# Patient Record
Sex: FEMALE | Race: WHITE | NOT HISPANIC OR LATINO | Employment: OTHER | URBAN - NONMETROPOLITAN AREA
[De-identification: names, ages, dates, MRNs, and addresses within clinical notes are randomized per-mention and may not be internally consistent; named-entity substitution may affect disease eponyms.]

---

## 2017-03-16 ENCOUNTER — TRANSFERRED RECORDS (OUTPATIENT)
Dept: HEALTH INFORMATION MANAGEMENT | Facility: OTHER | Age: 67
End: 2017-03-16

## 2018-11-30 ENCOUNTER — TRANSFERRED RECORDS (OUTPATIENT)
Dept: HEALTH INFORMATION MANAGEMENT | Facility: OTHER | Age: 68
End: 2018-11-30

## 2019-06-28 ENCOUNTER — HOSPITAL ENCOUNTER (OUTPATIENT)
Dept: HOSPITAL 11 - JP.SDS | Age: 69
Discharge: HOME | End: 2019-06-28
Attending: SURGERY
Payer: MEDICARE

## 2019-06-28 DIAGNOSIS — Z86.010: ICD-10-CM

## 2019-06-28 DIAGNOSIS — E78.5: ICD-10-CM

## 2019-06-28 DIAGNOSIS — D12.3: ICD-10-CM

## 2019-06-28 DIAGNOSIS — Z87.891: ICD-10-CM

## 2019-06-28 DIAGNOSIS — Z12.11: Primary | ICD-10-CM

## 2019-06-28 PROCEDURE — 45385 COLONOSCOPY W/LESION REMOVAL: CPT

## 2019-06-28 PROCEDURE — 45380 COLONOSCOPY AND BIOPSY: CPT

## 2019-06-28 NOTE — OR
DATE OF PROCEDURE:  06/28/2019

 

PREOPERATIVE DIAGNOSIS:  History of tubular adenomas.

 

POSTOPERATIVE DIAGNOSES:  Medium sized hepatic flexure polyp, small transverse colon polyp,

history of tubular adenomas.

 

PROCEDURE:  Colonoscopy to the cecum with snare cautery polypectomy of hepatic flexure polyp

and biopsy resection of small transverse colon polyp.

 

ANESTHESIA:  IV anesthesia with monitored anesthesia care.

 

INDICATION:  This 69-year-old white female is referred for a colonoscopy because of a

history of tubular adenoma.  She says her last colonoscopic exam was done 6 years ago.  I

counseled her for the procedure including risks, alternatives, and she gave her informed

consent to proceed.

 

DESCRIPTION OF PROCEDURE:  The patient was placed in the left lateral decubitus position.

IV anesthesia was administered by the Anesthesia Service.  Time-out was held.  A rectal exam

was performed, which was unremarkable.  The flexible video Olympus colonoscope was

introduced through her anus, up her rectum, and out her colon all the way to the cecum.

Once the cecum was reached, the scope was slowly withdrawn examining the mucosa throughout.

We saw a small polyp in the transverse colon, which was removed with the biopsy forceps.  We

thought we saw a polyp at the hepatic flexure, so we went back there and found it.  This was

little larger.  This was removed with the snare that involved placing a snare about its

base, elevating it up away from the bowel wall and amputated as electrocautery was applied.

The polyp was aspirated through the scope and captured in a polyp trap.  The scope was then

withdrawn further with no other lesions noted.  The scope was retroflexed in the rectum with

the distal rectum appearing unremarkable.  The scope was straightened and removed.  She

tolerated the procedure well.

 

 

 

 

Kali Damon MD

DD:  06/28/2019 09:25:59

DT:  06/28/2019 10:16:04

Job #:  [id number]

## 2020-09-10 ENCOUNTER — TRANSFERRED RECORDS (OUTPATIENT)
Dept: HEALTH INFORMATION MANAGEMENT | Facility: OTHER | Age: 70
End: 2020-09-10

## 2022-04-13 ENCOUNTER — OFFICE VISIT (OUTPATIENT)
Dept: FAMILY MEDICINE | Facility: OTHER | Age: 72
End: 2022-04-13
Attending: FAMILY MEDICINE
Payer: COMMERCIAL

## 2022-04-13 VITALS
WEIGHT: 230.2 LBS | OXYGEN SATURATION: 96 % | DIASTOLIC BLOOD PRESSURE: 80 MMHG | HEIGHT: 63 IN | RESPIRATION RATE: 16 BRPM | TEMPERATURE: 97.7 F | HEART RATE: 80 BPM | BODY MASS INDEX: 40.79 KG/M2 | SYSTOLIC BLOOD PRESSURE: 138 MMHG

## 2022-04-13 DIAGNOSIS — Z12.11 COLON CANCER SCREENING: ICD-10-CM

## 2022-04-13 DIAGNOSIS — R32 URINARY INCONTINENCE, UNSPECIFIED TYPE: ICD-10-CM

## 2022-04-13 DIAGNOSIS — M70.62 TROCHANTERIC BURSITIS OF LEFT HIP: ICD-10-CM

## 2022-04-13 DIAGNOSIS — G62.9 NEUROPATHY: ICD-10-CM

## 2022-04-13 DIAGNOSIS — E66.01 MORBID OBESITY (H): ICD-10-CM

## 2022-04-13 DIAGNOSIS — Z00.00 MEDICARE ANNUAL WELLNESS VISIT, SUBSEQUENT: Primary | ICD-10-CM

## 2022-04-13 DIAGNOSIS — Z78.0 ASYMPTOMATIC MENOPAUSE: ICD-10-CM

## 2022-04-13 PROCEDURE — G0439 PPPS, SUBSEQ VISIT: HCPCS | Performed by: FAMILY MEDICINE

## 2022-04-13 PROCEDURE — G0463 HOSPITAL OUTPT CLINIC VISIT: HCPCS

## 2022-04-13 PROCEDURE — 99203 OFFICE O/P NEW LOW 30 MIN: CPT | Mod: 25 | Performed by: FAMILY MEDICINE

## 2022-04-13 RX ORDER — PROMETHAZINE HYDROCHLORIDE 25 MG/1
1 TABLET ORAL DAILY
COMMUNITY
Start: 2021-07-08

## 2022-04-13 RX ORDER — GABAPENTIN 300 MG/1
300 CAPSULE ORAL 2 TIMES DAILY
Qty: 180 CAPSULE | Refills: 0 | Status: SHIPPED | OUTPATIENT
Start: 2022-04-13 | End: 2022-06-09

## 2022-04-13 RX ORDER — CHLORAL HYDRATE 500 MG
1 CAPSULE ORAL DAILY
COMMUNITY

## 2022-04-13 RX ORDER — OXYBUTYNIN CHLORIDE 5 MG/1
5 TABLET ORAL
COMMUNITY
Start: 2021-10-28 | End: 2022-04-13 | Stop reason: SINTOL

## 2022-04-13 RX ORDER — IBUPROFEN 800 MG/1
800 TABLET, FILM COATED ORAL EVERY 8 HOURS PRN
Status: CANCELLED | OUTPATIENT
Start: 2022-04-13

## 2022-04-13 RX ORDER — IBUPROFEN 800 MG/1
1 TABLET, FILM COATED ORAL EVERY 8 HOURS PRN
COMMUNITY
Start: 2020-06-04 | End: 2022-10-05

## 2022-04-13 RX ORDER — OMEPRAZOLE 40 MG/1
40 CAPSULE, DELAYED RELEASE ORAL DAILY PRN
COMMUNITY
Start: 2020-08-03 | End: 2022-10-05

## 2022-04-13 RX ORDER — GABAPENTIN 300 MG/1
300 CAPSULE ORAL 2 TIMES DAILY
COMMUNITY
Start: 2020-06-04 | End: 2022-04-13

## 2022-04-13 RX ORDER — MENTHOL 40 MG/ML
GEL TOPICAL PRN
COMMUNITY

## 2022-04-13 ASSESSMENT — ACTIVITIES OF DAILY LIVING (ADL): CURRENT_FUNCTION: HOUSEWORK REQUIRES ASSISTANCE

## 2022-04-13 ASSESSMENT — PAIN SCALES - GENERAL: PAINLEVEL: NO PAIN (0)

## 2022-04-13 NOTE — PROGRESS NOTES
"SUBJECTIVE:   Denise Barclay is a 71 year old female who presents for Preventive Visit.    Patient has been advised of split billing requirements and indicates understanding: Yes  Are you in the first 12 months of your Medicare Part B coverage?  No    Physical Health:    In general, how would you rate your overall physical health? good    Outside of work, how many days during the week do you exercise? 4-5 days/week    Outside of work, approximately how many minutes a day do you exercise?not applicable    If you drink alcohol do you typically have >3 drinks per day or >7 drinks per week? No    Do you usually eat at least 4 servings of fruit and vegetables a day, include whole grains & fiber and avoid regularly eating high fat or \"junk\" foods? Yes    Do you have any problems taking medications regularly?  No    Do you have any side effects from medications? none    Needs assistance for the following daily activities: housework    Which of the following safety concerns are present in your home?  none identified     Hearing impairment: No    In the past 6 months, have you been bothered by leaking of urine? yes    Mental Health:    In general, how would you rate your overall mental or emotional health? good  PHQ-2 Score: 0    Do you feel safe in your environment? Yes    Have you ever done Advance Care Planning? (For example, a Health Directive, POLST, or a discussion with a medical provider or your loved ones about your wishes): No, advance care planning information given to patient to review.  Patient plans to discuss their wishes with loved ones or provider.      Additional concerns to address?  YES urinary  She has had issues for years.  She reports continual leaking of small amounts of urine throughout the day and the need to urinate frequently throughout the night.  She has discussed this with her previous primary care provider and was tried on Oxybutynin but reports that this caused significant dryness of her " "mouth.  She discontinued this medication after about five days, as she could not tolerate it.  She was referred to urology but this was not scheduled prior to leaving for [place] for four months.    Fall risk:  Fallen 2 or more times in the past year?: No  Any fall with injury in the past year?: No    Cognitive Screenin) Repeat 3 items (Leader, Season, Table)    2) Clock draw: NORMAL  3) 3 item recall: Recalls 2 objects   Results: NORMAL clock, 1-2 items recalled: COGNITIVE IMPAIRMENT LESS LIKELY    Mini-CogTM Copyright [employer]LE[employer] Shah. Licensed by the author for use in [facility]; reprinted with permission (clari@[facility]). All rights reserved.      Do you have sleep apnea, excessive snoring or daytime drowsiness?: no    Neuropathy:  She has been diagnosed with neuropathy in her legs bilaterally.  She was managed on Gabapentin for several years and then discontinued them on her own.  When she developed pain in her left foot, she was placed back on this medication.  She reports that she took the medication consistently throughout the carli and winter but does not feel that it particularly helped her.  Current pain is located around her left ankle.  She describes the pain as burning.  Symptoms are intermittent and seem to occur randomly.  She states that she was sent for a test for neuropathy and was told she had symptoms on both of her legs.    She has a history of left trochanteric bursitis.  She states that she has been told she has a \"ruptured bursa\" in the past.  This has been managed with injections every four months with good results.  She uses Ibuprofen as needed for any breakthrough pain.    Reviewed and updated as needed this visit by clinical staff   Tobacco  Allergies  Meds   Med Hx  Surg Hx  Fam Hx  Soc Hx        Reviewed and updated as needed this visit by Provider                   Social History     Tobacco Use     Smoking status: Never Smoker     Smokeless tobacco: Never Used " "  Substance Use Topics     Alcohol use: Not Currently                    Current providers sharing in care for this patient include:  Patient Care Team:  No Ref-Primary, Physician as PCP - General    The following health maintenance items are reviewed in Epic and correct as of today:  Health Maintenance   Topic Date Due     DEXA  Never done     ADVANCE CARE PLANNING  Never done     COLORECTAL CANCER SCREENING  Never done     HEPATITIS C SCREENING  Never done     LIPID  Never done     MEDICARE ANNUAL WELLNESS VISIT  Never done     ZOSTER IMMUNIZATION (2 of 3) 03/07/2016     MAMMO SCREENING  09/10/2022     FALL RISK ASSESSMENT  04/13/2023     DTAP/TDAP/TD IMMUNIZATION (2 - Td or Tdap) 01/11/2026     PHQ-2 (once per calendar year)  Completed     INFLUENZA VACCINE  Completed     Pneumococcal Vaccine: 65+ Years  Completed     COVID-19 Vaccine  Completed     IPV IMMUNIZATION  Aged Out     MENINGITIS IMMUNIZATION  Aged Out     HEPATITIS B IMMUNIZATION  Aged Out     Labs reviewed in EPIC  Mammogram Screening: Mammogram Screening: Recommended mammography every 1-2 years with patient discussion and risk factor consideration    ROS:  Constitutional, HEENT, cardiovascular, pulmonary, GI, , musculoskeletal, neuro, skin, endocrine and psych systems are negative, except as otherwise noted.    OBJECTIVE:   BP (!) 144/80   Pulse 80   Temp 97.7  F (36.5  C) (Tympanic)   Resp 16   Ht 1.6 m (5' 3\")   Wt 104.4 kg (230 lb 3.2 oz)   SpO2 96%   Breastfeeding No   BMI 40.78 kg/m   Estimated body mass index is 40.78 kg/m  as calculated from the following:    Height as of this encounter: 1.6 m (5' 3\").    Weight as of this encounter: 104.4 kg (230 lb 3.2 oz).  EXAM:   GENERAL: healthy, alert and no distress  EYES: Eyes grossly normal to inspection, PERRL and conjunctivae and sclerae normal  HENT: ear canals with cerumen impaction bilaterally, nose and mouth without ulcers or lesions  NECK: no adenopathy, no asymmetry, masses, or " scars and thyroid normal to palpation  RESP: lungs clear to auscultation - no rales, rhonchi or wheezes  CV: regular rate and rhythm, normal S1 S2, no S3 or S4, no murmur, click or rub, no peripheral edema and peripheral pulses strong  ABDOMEN: soft, nontender, no hepatosplenomegaly, no masses and bowel sounds normal  MS: no gross musculoskeletal defects noted, no edema  NEURO: Normal strength and tone, mentation intact and speech normal  PSYCH: mentation appears normal, affect normal/bright    Diagnostic Test Results:  Labs reviewed in Epic    ASSESSMENT / PLAN:   (Z00.00) Medicare annual wellness visit, subsequent  (primary encounter diagnosis)  No indication for daily ASA.  BP borderline.  Goal < 130/80.  Continue to monitor.  Lipid panel reviewed.  She has been working on a healthier diet and lifestyle.  Will plan to recheck lipid levels in six months.  Due for laboratory monitoring after July.  Follow-up at that time.  She declines Hepatitis C screening today but would consider having it completed with her other blood work this summer.  S/P breast reduction surgery with bilateral fat necrosis and breast tenderness.  Discussed risks and benefits of mammogram for breast cancer screening.  She declines this year.  She will be due for colon cancer screening this year.  Referral placed.  No indication for further cervical cancer screening.  DEXA ordered for bone density assessment.  Encouraged her to obtain Shingrix vaccine through her pharmacy.  Remainder of immunizations up-to-date.  Counseled on healthy diet and exercise.  No depression.  No cognitive impairment.    (G62.9) Neuropathy  Comment: She will continue Gabapentin for now and consider whether she wants to continue this medication when her prescription runs out.  Plan: gabapentin (NEURONTIN) 300 MG capsule    (M70.62) Trochanteric bursitis of left hip  Comment: Control of pain with injections and Ibuprofen use as needed, rarely.  Plan: She will continue  "with injections every four months.    (R32) Urinary incontinence, unspecified type  Comment: She has failed medication management and was in the process of being referred to urology with her previous PCP.  Referral placed today.  Plan: Adult Urology Referral    (Z78.0) Asymptomatic menopause  Plan: DX Hip/Pelvis/Spine    (Z12.11) Colon cancer screening  Plan: Adult Gastro Ref - Procedure Only    (E66.01) Morbid obesity (H)  Working on healthy diet and lifestyle to promote weight loss.  Reassess on follow-up in six months.    Patient has been advised of split billing requirements and indicates understanding: Yes    COUNSELING:  Reviewed preventive health counseling, as reflected in patient instructions       Regular exercise       Healthy diet/nutrition       Vision screening       Hearing screening       Dental care       Bladder control       Immunizations       Osteoporosis prevention/bone health       Hepatitis C screening    Estimated body mass index is 40.78 kg/m  as calculated from the following:    Height as of this encounter: 1.6 m (5' 3\").    Weight as of this encounter: 104.4 kg (230 lb 3.2 oz).    Weight management plan: Discussed healthy diet and exercise guidelines    She reports that she has never smoked. She has never used smokeless tobacco.    Appropriate preventive services were discussed with this patient, including applicable screening as appropriate for cardiovascular disease, diabetes, osteopenia/osteoporosis, and glaucoma.  As appropriate for age/gender, discussed screening for colorectal cancer, prostate cancer, breast cancer, and cervical cancer. Checklist reviewing preventive services available has been given to the patient.    Reviewed patients plan of care and provided an AVS. The Basic Care Plan (routine screening as documented in Health Maintenance) for Denise meets the Care Plan requirement. This Care Plan has been established and reviewed with the Patient.    Counseling " Resources:  ATP IV Guidelines  Pooled Cohorts Equation Calculator  Breast Cancer Risk Calculator  BRCA-Related Cancer Risk Assessment: FHS-7 Tool  FRAX Risk Assessment  ICSI Preventive Guidelines  Dietary Guidelines for Americans, 2010  USDA's MyPlate  ASA Prophylaxis  Lung CA Screening    [facility]

## 2022-04-13 NOTE — NURSING NOTE
"Chief Complaint   Patient presents with     Medicare Visit     annual         Initial /80   Pulse 80   Temp 97.7  F (36.5  C) (Tympanic)   Resp 16   Ht 1.6 m (5' 3\")   Wt 104.4 kg (230 lb 3.2 oz)   SpO2 96%   Breastfeeding No   BMI 40.78 kg/m   Estimated body mass index is 40.78 kg/m  as calculated from the following:    Height as of this encounter: 1.6 m (5' 3\").    Weight as of this encounter: 104.4 kg (230 lb 3.2 oz).         Norma J. Gosselin, LPN   "

## 2022-04-25 DIAGNOSIS — Z12.11 ENCOUNTER FOR SCREENING COLONOSCOPY: Primary | ICD-10-CM

## 2022-04-25 NOTE — TELEPHONE ENCOUNTER
Screening Questions for the Scheduling of Screening Colonoscopies   (If Colonoscopy is diagnostic, Provider should review the chart before scheduling.)  Are you younger than 50 or older than 80?  NO  Do you take aspirin or fish oil?  YES (if yes, tell patient to stop 1 week prior to Colonoscopy)  Do you take warfarin (Coumadin), clopidogrel (Plavix), apixaban (Eliquis), dabigatram (Pradaxa), rivaroxaban (Xarelto) or any blood thinner? NO  Do you use oxygen at home?  NO  Do you have kidney disease? NO  Are you on dialysis? NO  Have you had a stroke or heart attack in the last year? NO  Have you had a stent in your heart or any blood vessel in the last year? NO  Have you had a transplant of any organ? NO  Have you had a colonoscopy or upper endoscopy (EGD) before? YES         When?  06/28/2019  Date of scheduled Colonoscopy. 06/16/2022  Provider PRATIK  Pharmacy GUIDE POINT IN [place]

## 2022-04-26 RX ORDER — POLYETHYLENE GLYCOL 3350, SODIUM CHLORIDE, SODIUM BICARBONATE, POTASSIUM CHLORIDE 420; 11.2; 5.72; 1.48 G/4L; G/4L; G/4L; G/4L
4000 POWDER, FOR SOLUTION ORAL ONCE
Qty: 4000 ML | Refills: 0 | Status: SHIPPED | OUTPATIENT
Start: 2022-04-26 | End: 2022-04-26

## 2022-04-26 RX ORDER — BISACODYL 5 MG
TABLET, DELAYED RELEASE (ENTERIC COATED) ORAL
Qty: 2 TABLET | Refills: 0 | Status: ON HOLD | OUTPATIENT
Start: 2022-04-26 | End: 2022-06-16

## 2022-04-28 ENCOUNTER — HOSPITAL ENCOUNTER (OUTPATIENT)
Dept: BONE DENSITY | Facility: OTHER | Age: 72
Discharge: HOME | End: 2022-04-28
Attending: FAMILY MEDICINE | Admitting: FAMILY MEDICINE
Payer: COMMERCIAL

## 2022-04-28 DIAGNOSIS — Z78.0 ASYMPTOMATIC MENOPAUSE: ICD-10-CM

## 2022-04-28 PROCEDURE — 77080 DXA BONE DENSITY AXIAL: CPT

## 2022-05-04 ENCOUNTER — OFFICE VISIT (OUTPATIENT)
Dept: UROLOGY | Facility: OTHER | Age: 72
End: 2022-05-04
Attending: FAMILY MEDICINE
Payer: COMMERCIAL

## 2022-05-04 VITALS
OXYGEN SATURATION: 98 % | BODY MASS INDEX: 39.79 KG/M2 | HEART RATE: 72 BPM | SYSTOLIC BLOOD PRESSURE: 138 MMHG | WEIGHT: 224.6 LBS | DIASTOLIC BLOOD PRESSURE: 82 MMHG | RESPIRATION RATE: 18 BRPM

## 2022-05-04 DIAGNOSIS — N32.81 OVERACTIVE BLADDER: Primary | ICD-10-CM

## 2022-05-04 DIAGNOSIS — N39.41 URGE INCONTINENCE: ICD-10-CM

## 2022-05-04 LAB
ALBUMIN UR-MCNC: NEGATIVE MG/DL
APPEARANCE UR: CLEAR
BACTERIA #/AREA URNS HPF: ABNORMAL /HPF
BILIRUB UR QL STRIP: NEGATIVE
COLOR UR AUTO: YELLOW
GLUCOSE UR STRIP-MCNC: NEGATIVE MG/DL
HGB UR QL STRIP: ABNORMAL
KETONES UR STRIP-MCNC: NEGATIVE MG/DL
LEUKOCYTE ESTERASE UR QL STRIP: ABNORMAL
MUCOUS THREADS #/AREA URNS LPF: PRESENT /LPF
NITRATE UR QL: NEGATIVE
PH UR STRIP: 5.5 [PH] (ref 5–9)
RBC URINE: 2 /HPF
RENAL TUB EPI: <1 /HPF
SP GR UR STRIP: 1.02 (ref 1–1.03)
SQUAMOUS EPITHELIAL: 12 /HPF
TRANSITIONAL EPI: 1 /HPF
UROBILINOGEN UR STRIP-MCNC: NORMAL MG/DL
WBC URINE: 20 /HPF

## 2022-05-04 PROCEDURE — 81001 URINALYSIS AUTO W/SCOPE: CPT | Mod: ZL | Performed by: UROLOGY

## 2022-05-04 PROCEDURE — G0463 HOSPITAL OUTPT CLINIC VISIT: HCPCS | Mod: 25

## 2022-05-04 PROCEDURE — G0463 HOSPITAL OUTPT CLINIC VISIT: HCPCS

## 2022-05-04 PROCEDURE — 51798 US URINE CAPACITY MEASURE: CPT | Performed by: UROLOGY

## 2022-05-04 PROCEDURE — 99204 OFFICE O/P NEW MOD 45 MIN: CPT | Performed by: UROLOGY

## 2022-05-04 ASSESSMENT — PAIN SCALES - GENERAL: PAINLEVEL: NO PAIN (0)

## 2022-05-04 NOTE — PROGRESS NOTES
Type of Visit  NPV    Chief Complaint  Incontinence    HPI  Ms. Barclay is a 71 year old female who presents with incontinence.  Patient leaks urine about several times a day.  Volume of incontinence is described as small .  Overall, leaking urine interferes (bother score) with daily living approximately 9/10.  Patient uses 2 pads per day.  Onset was 3 years ago.  Patient denies: dysuria and gross hematuria.    Patient has tried anticholinergic medication in the past however she developed dose-limiting side effects (dry mouth and eyes) prompting discontinuation.    Leakage of urine occurs with urgency? Yes  Leakage of urine occurs with valsalva? Yes  Leakage of urine occurs without sensation? 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  Patient Active Problem List   Diagnosis     Morbid obesity (H)       Past Surgical History  She  has a past surgical history that includes REDUCTION OF LARGE BREAST (Bilateral, 10/2020).    Medications  She has a current medication list which includes the following prescription(s): bisacodyl, fish oil-omega-3 fatty acids, gabapentin, ibuprofen, biofreeze roll-on, multiple vitamins-iron, and omeprazole.    Allergies  No Known Allergies    Social History  She  reports that she has never smoked. She has never used smokeless tobacco. She reports previous alcohol use. She reports that she does not use drugs.  No drug abuse.    Family History  Family History   Problem Relation Age of Onset     Thyroid Disease Sister      Thyroid Disease Sister        Review of Systems  I personally reviewed the ROS with the patient.    Nursing Notes:   Marysol Benavides LPN  5/4/2022 11:11 AM  Addendum  Chief Complaint   Patient presents with     Consult     incontinence   Patient presents to the clinic today for a consult for incontinence    Review of Systems:    Weight loss:    No     Recent fever/chills:  No   Night  sweats:   No  Current skin rash:  No   Recent hair loss:  No  Heat intolerance:  No   Cold intolerance:  No  Chest pain:   No   Palpitations:   No  Shortness of breath:  No   Wheezing:   No  Constipation:    No   Diarrhea:   No   Nausea:   No   Vomiting:   No   Kidney/side pain:  No   Back pain:   No  Frequent headaches:  No   Dizziness:     No  Leg swelling:   No   Calf pain:    No    Parents, brothers or sisters with history of kidney cancer:   No  Parents, brothers or sisters with history of bladder cancer: No     Post-Void Residual  A post-void residual was measured by ultrasonic bladder scanner.  0 mL        Medication Reconciliation: completed   Marysol Benavides LPN  5/4/2022 11:10 AM       Physical Exam  Vitals:    05/04/22 1130   BP: 138/82   BP Location: Right arm   Patient Position: Sitting   Cuff Size: Adult Large   Pulse: 72   Resp: 18   SpO2: 98%   Weight: 101.9 kg (224 lb 9.6 oz)     Constitutional: NAD, WDWN  Head: NCAT  Eyes: Conjunctivae normal  Cardiovascular: Regular rate  Pulmonary/Chest: Respirations are even and non-labored bilaterally  Abdominal: Soft with no distension, tenderness, masses, guarding or CVA tenderness  Neurological: A + O x 3,  cranial nerves II-XII grossly intact  Extremities: NOLBERTO x 4, warm, no clubbing, no cyanosis  Skin: Pink, warm, dry with no rash  Psychiatric:  Normal mood and affect  Genitourinary: Nonpalpable bladder    Labs  Results for orders placed or performed in visit on 05/04/22   UA reflex to Microscopic     Status: Abnormal   Result Value Ref Range    Color Urine Yellow Colorless, Straw, Light Yellow, Yellow    Appearance Urine Clear Clear    Glucose Urine Negative Negative mg/dL    Bilirubin Urine Negative Negative    Ketones Urine Negative Negative mg/dL    Specific Gravity Urine 1.020 1.000 - 1.030    Blood Urine Trace (A) Negative    pH Urine 5.5 5.0 - 9.0    Protein Albumin Urine Negative Negative mg/dL    Urobilinogen Urine Normal Normal, 2.0 mg/dL    Nitrite  Urine Negative Negative    Leukocyte Esterase Urine Large (A) Negative    Bacteria Urine Few (A) None Seen /HPF    RBC Urine 2 <=2 /HPF    WBC Urine 20 (H) <=5 /HPF    Squamous Epithelials Urine 12 (H) <=1 /HPF    Mucus Urine Present (A) None Seen /LPF    Transitional Epithelials Urine 1 <=1 /HPF    Renal Tubular Epithelials Urine <1 None Seen /HPF     Post-Void Residual  A post-void residual was measured by ultrasonic bladder scanner.  0 mL today    Assessment  Ms. Barclay is a 71 year old female who presents with clinically predominate urge incontinence.  PVR reveals she is emptying well.  Urinalysis is contaminated given the significant number of squamous cells.  She is asymptomatic and, as such, the urinalysis is interpreted as negative for UTI.  No abnormal blood present.    Patient complains of significant urinary frequency, urgency and urge incontinence.    Prior treatments include attempted behavior modification and anticholinergic medication.  Failure of anticholinergics due to poor efficacy and intolerable side effects.  Treatment options were discussed: Botox injection, posterior tibial nerve stimulation, and Interstim neuromodulation.      Patient elected to proceed with intradetrusor Botox injections.  Discussed risks of UTI and/or urinary retention (6%) requiring self cath or indwelling catheter for a temporary period of time.  The benefit lasts about 6 months on average and I explained the need for retreatment.    Plan  Intravesical Botox 100 units in the clinic

## 2022-05-04 NOTE — NURSING NOTE
Chief Complaint   Patient presents with     Consult     incontinence   Patient presents to the clinic today for a consult for incontinence    Review of Systems:    Weight loss:    No     Recent fever/chills:  No   Night sweats:   No  Current skin rash:  No   Recent hair loss:  No  Heat intolerance:  No   Cold intolerance:  No  Chest pain:   No   Palpitations:   No  Shortness of breath:  No   Wheezing:   No  Constipation:    No   Diarrhea:   No   Nausea:   No   Vomiting:   No   Kidney/side pain:  No   Back pain:   No  Frequent headaches:  No   Dizziness:     No  Leg swelling:   No   Calf pain:    No    Parents, brothers or sisters with history of kidney cancer:   No  Parents, brothers or sisters with history of bladder cancer: No     Post-Void Residual  A post-void residual was measured by ultrasonic bladder scanner.  0 mL        Medication Reconciliation: completed   Marysol Benavides LPN  5/4/2022 11:10 AM

## 2022-06-01 ENCOUNTER — HOSPITAL ENCOUNTER (OUTPATIENT)
Dept: MAMMOGRAPHY | Facility: OTHER | Age: 72
Discharge: HOME | End: 2022-06-01
Attending: FAMILY MEDICINE | Admitting: FAMILY MEDICINE
Payer: COMMERCIAL

## 2022-06-01 DIAGNOSIS — Z12.31 VISIT FOR SCREENING MAMMOGRAM: ICD-10-CM

## 2022-06-01 PROCEDURE — 77067 SCR MAMMO BI INCL CAD: CPT

## 2022-06-02 ENCOUNTER — OFFICE VISIT (OUTPATIENT)
Dept: UROLOGY | Facility: OTHER | Age: 72
End: 2022-06-02
Attending: UROLOGY
Payer: COMMERCIAL

## 2022-06-02 DIAGNOSIS — N32.81 OVERACTIVE BLADDER: Primary | ICD-10-CM

## 2022-06-02 PROCEDURE — G0463 HOSPITAL OUTPT CLINIC VISIT: HCPCS | Mod: 25

## 2022-06-02 PROCEDURE — 52287 CYSTOSCOPY CHEMODENERVATION: CPT | Performed by: UROLOGY

## 2022-06-02 PROCEDURE — 96372 THER/PROPH/DIAG INJ SC/IM: CPT | Performed by: UROLOGY

## 2022-06-02 PROCEDURE — 250N000020 HC RX IP 250 OP 636 J0585: Performed by: UROLOGY

## 2022-06-02 PROCEDURE — 250N000013 HC RX MED GY IP 250 OP 250 PS 637: Performed by: UROLOGY

## 2022-06-02 RX ORDER — CEFUROXIME AXETIL 250 MG/1
500 TABLET ORAL EVERY 12 HOURS SCHEDULED
Status: DISCONTINUED | OUTPATIENT
Start: 2022-06-02 | End: 2022-06-09

## 2022-06-02 RX ADMIN — ONABOTULINUMTOXINA 100 UNITS: 100 INJECTION, POWDER, LYOPHILIZED, FOR SOLUTION INTRADERMAL; INTRAMUSCULAR at 13:19

## 2022-06-02 RX ADMIN — CEFUROXIME AXETIL 500 MG: 250 TABLET, FILM COATED ORAL at 13:19

## 2022-06-02 NOTE — PATIENT INSTRUCTIONS
Home Care after Cystoscopy  Follow these guidelines for your care after your procedure.    Activity  No limitations    Bathing or showering  No limitations    Symptoms  You may notice some burning with urination but this usually resolves after 1-2 days.  You may also notice small amounts of blood in your urine.  Please increase water intake for the next few days to help with these symptoms.    Contacts  General Questions: [phone]  Appointments:  [phone]  Emergencies:  [phone]    When to call the clinic  If you develop any of the following symptoms please call the clinic immediately.  If the clinic is closed please be seen at an urgent care clinic or the Emergency Department.  - Burning with urination that worsens after 2 days  - Unable to urinate causing severe pelvic pain  - Fevers of greater than 101 degrees F  - Flank pain that is not responding to pain medication    Follow up  Please follow up as discussed at the appointment.    Home Care after Botox Treatment  Follow these guidelines for your care after your procedure.    Activity  No limitations    Bathing or showering  No limitations    Symptoms  You may notice some burning with urination but this usually resolves after 1-2 days.  You may also notice small amounts of blood in your urine.  Please increase water intake for the next few days to help with these symptoms.    Contacts  General Questions: [phone]  Appointments:  [phone]  Emergencies:  [phone]    When to call the clinic  If you develop any of the following symptoms please call the clinic immediately.  If the clinic is closed please be seen at an urgent care clinic or the Emergency Department.  - Burning with urination that worsens after 2 days  - Unable to urinate causing severe pelvic pain  - Fevers of greater than 101 degrees F  - Flank pain that is not responding to pain medication    Follow up  If you are currently taking an anticholinergic for urgency (such as oxybutynin,  Detrol, Toviaz or Sanctura) please continue for 1 week then stop.  Please follow up in 4-6 weeks for a bladder scan.

## 2022-06-02 NOTE — NURSING NOTE
Botox  Verbal Order per Brendon Lyle MD Read Back to prep patient.  Perineum area prepped with chlorhexidene Gluconate and patient draped per sterile technique.    Sodium Chloride 0.9%, 10mL mixed with Botox  Lot #: [id number]  Expiration date: 11/1/23  : [employer]  NDC: [id number]    [facility] Protocol    A. Pre-procedure verification complete Yes  1-relevant information / documentation available, reviewed and properly matched to the patient; 2-consent accurate and complete, 3-equipment and supplies available    B. Site marking complete N/A  Site marked if not in continuous attendance with patient    C. TIME OUT completed Yes  Time Out was conducted just prior to starting procedure to verify the eight required elements: 1-patient identity, 2-consent accurate and complete, 3-position, 4-correct side/site marked (if applicable), 5-procedure, 6-relevant images / results properly labeled and displayed (if applicable), 7-antibiotics / irrigation fluids (if applicable), 8-safety precautions.    After procedure perineum area rinsed.  Discharge instructions reviewed with patient.  Patient verbalized understanding of discharge instructions and discharged ambulatory.

## 2022-06-16 ENCOUNTER — HOSPITAL ENCOUNTER (OUTPATIENT)
Facility: OTHER | Age: 72
Discharge: HOME | End: 2022-06-16
Attending: SURGERY | Admitting: SURGERY
Payer: COMMERCIAL

## 2022-06-16 ENCOUNTER — ANESTHESIA EVENT (OUTPATIENT)
Dept: SURGERY | Facility: OTHER | Age: 72
End: 2022-06-16
Payer: COMMERCIAL

## 2022-06-16 ENCOUNTER — ANESTHESIA (OUTPATIENT)
Dept: SURGERY | Facility: OTHER | Age: 72
End: 2022-06-16
Payer: COMMERCIAL

## 2022-06-16 VITALS
TEMPERATURE: 98.2 F | DIASTOLIC BLOOD PRESSURE: 78 MMHG | OXYGEN SATURATION: 96 % | RESPIRATION RATE: 18 BRPM | HEART RATE: 66 BPM | SYSTOLIC BLOOD PRESSURE: 125 MMHG

## 2022-06-16 PROCEDURE — 45385 COLONOSCOPY W/LESION REMOVAL: CPT | Mod: PT | Performed by: SURGERY

## 2022-06-16 PROCEDURE — 88305 TISSUE EXAM BY PATHOLOGIST: CPT

## 2022-06-16 PROCEDURE — 45380 COLONOSCOPY AND BIOPSY: CPT | Performed by: SURGERY

## 2022-06-16 PROCEDURE — 45385 COLONOSCOPY W/LESION REMOVAL: CPT | Performed by: NURSE ANESTHETIST, CERTIFIED REGISTERED

## 2022-06-16 PROCEDURE — 250N000009 HC RX 250: Performed by: NURSE ANESTHETIST, CERTIFIED REGISTERED

## 2022-06-16 PROCEDURE — 258N000003 HC RX IP 258 OP 636: Performed by: SURGERY

## 2022-06-16 PROCEDURE — 250N000011 HC RX IP 250 OP 636: Performed by: NURSE ANESTHETIST, CERTIFIED REGISTERED

## 2022-06-16 PROCEDURE — 99100 ANES PT EXTEME AGE<1 YR&>70: CPT | Performed by: NURSE ANESTHETIST, CERTIFIED REGISTERED

## 2022-06-16 PROCEDURE — 258N000003 HC RX IP 258 OP 636: Performed by: NURSE ANESTHETIST, CERTIFIED REGISTERED

## 2022-06-16 RX ORDER — PROPOFOL 10 MG/ML
INJECTION, EMULSION INTRAVENOUS CONTINUOUS PRN
Status: DISCONTINUED | OUTPATIENT
Start: 2022-06-16 | End: 2022-06-16

## 2022-06-16 RX ORDER — NALOXONE HYDROCHLORIDE 0.4 MG/ML
0.4 INJECTION, SOLUTION INTRAMUSCULAR; INTRAVENOUS; SUBCUTANEOUS
Status: DISCONTINUED | OUTPATIENT
Start: 2022-06-16 | End: 2022-06-16 | Stop reason: HOSPADM

## 2022-06-16 RX ORDER — LIDOCAINE 40 MG/G
CREAM TOPICAL
Status: DISCONTINUED | OUTPATIENT
Start: 2022-06-16 | End: 2022-06-16 | Stop reason: HOSPADM

## 2022-06-16 RX ORDER — FLUMAZENIL 0.1 MG/ML
0.2 INJECTION, SOLUTION INTRAVENOUS
Status: DISCONTINUED | OUTPATIENT
Start: 2022-06-16 | End: 2022-06-16 | Stop reason: HOSPADM

## 2022-06-16 RX ORDER — NALOXONE HYDROCHLORIDE 0.4 MG/ML
0.2 INJECTION, SOLUTION INTRAMUSCULAR; INTRAVENOUS; SUBCUTANEOUS
Status: DISCONTINUED | OUTPATIENT
Start: 2022-06-16 | End: 2022-06-16 | Stop reason: HOSPADM

## 2022-06-16 RX ORDER — PROPOFOL 10 MG/ML
INJECTION, EMULSION INTRAVENOUS PRN
Status: DISCONTINUED | OUTPATIENT
Start: 2022-06-16 | End: 2022-06-16

## 2022-06-16 RX ORDER — SODIUM CHLORIDE, SODIUM LACTATE, POTASSIUM CHLORIDE, CALCIUM CHLORIDE 600; 310; 30; 20 MG/100ML; MG/100ML; MG/100ML; MG/100ML
INJECTION, SOLUTION INTRAVENOUS CONTINUOUS PRN
Status: DISCONTINUED | OUTPATIENT
Start: 2022-06-16 | End: 2022-06-16

## 2022-06-16 RX ORDER — LIDOCAINE HYDROCHLORIDE 20 MG/ML
INJECTION, SOLUTION INFILTRATION; PERINEURAL PRN
Status: DISCONTINUED | OUTPATIENT
Start: 2022-06-16 | End: 2022-06-16

## 2022-06-16 RX ORDER — SODIUM CHLORIDE, SODIUM LACTATE, POTASSIUM CHLORIDE, CALCIUM CHLORIDE 600; 310; 30; 20 MG/100ML; MG/100ML; MG/100ML; MG/100ML
INJECTION, SOLUTION INTRAVENOUS CONTINUOUS
Status: DISCONTINUED | OUTPATIENT
Start: 2022-06-16 | End: 2022-06-16 | Stop reason: HOSPADM

## 2022-06-16 RX ADMIN — LIDOCAINE HYDROCHLORIDE 100 MG: 20 INJECTION, SOLUTION INFILTRATION; PERINEURAL at 10:26

## 2022-06-16 RX ADMIN — SODIUM CHLORIDE, POTASSIUM CHLORIDE, SODIUM LACTATE AND CALCIUM CHLORIDE: 600; 310; 30; 20 INJECTION, SOLUTION INTRAVENOUS at 09:59

## 2022-06-16 RX ADMIN — SODIUM CHLORIDE, SODIUM LACTATE, POTASSIUM CHLORIDE, AND CALCIUM CHLORIDE: 600; 310; 30; 20 INJECTION, SOLUTION INTRAVENOUS at 10:23

## 2022-06-16 RX ADMIN — PROPOFOL 200 MCG/KG/MIN: 10 INJECTION, EMULSION INTRAVENOUS at 10:26

## 2022-06-16 RX ADMIN — PROPOFOL 100 MG: 10 INJECTION, EMULSION INTRAVENOUS at 10:26

## 2022-06-16 NOTE — DISCHARGE INSTRUCTIONS
INSTRUCTIONS AFTER COLONOSCOPY    WHEN YOU ARE BACK HOME:  Plan to rest for an hour or two after you get home.  You may have some cramping or pressure until you pass gas.  You may resume your regular medications.  Eat a small, light meal at first, and then gradually return to normal meal sizes.  You will be contacted the next day to see how you are doing.  If you had a polyp removed:  Slight bleeding may occur.  You may have a slight blood stain on the toilet paper after a bowel movement.  To lessen the chance of bleeding, avoid heavy exercise for ONE WEEK.  This includes heavy lifting, vigorous sport activities, and heavy physical labor.  You may resume your normal sexual activity.    Avoid aspirin or aspirin products if instructed by your doctor.  If there is a polyp or biopsy, you will be contacted with results within one week.     WHAT TO WATCH FOR:  Problems rarely occur after the exam; however, it is important for you to watch for early signs of possible problems.  If you have   Unusual pain in your abdomen  Nausea and vomiting that persists  Excessive bleeding  Black or bloody bowel movements  Fever or temperature above 100.6 F  [facility]  Adult Discharge Orders & Instructions                 For 12 hours after surgery  Get plenty of rest.  A responsible adult must stay with you for at least 12 hours after you leave the hospital.   You may feel lightheaded.  IF so, sit for a few minutes before standing.  Have someone help you get up.   You may have a slight fever. Call the doctor if your fever is over 101 F (38.3 C) (taken under the tongue) or lasts longer than 24 hours.  You may have a dry mouth, a sore throat, muscle aches or trouble sleeping.  These should go away after 24 hours.  Do not make important or legal decisions.  6.   Do not drive or use heavy equipment.  If you have weakness or tingling after 12 hours, don't drive or use heavy equipment until this feeling goes away.    To  contact a doctor please call,  [phone] Your doctor recommends that you eat 25 to 30 Grams of fiber daily. The following are some examples of fiber amounts in different foods.    Fruits: Apple (with skin) 1 medium = 4.4 Grams   Banana      1 medium = 3.1 Grams   Oranges     1 orange = 3.1 Grams   Prunes     1 cup, pitted = 12.4 Grams    Juices: Apple, unsweetened w/ added ascorbic acid  1 cup = 0.5 Grams    Grapefruit, white, canned,sweetened  1 cup = 0.2 Grams    Grape, unsweetened w/ added ascorbic acid  1 cup = 0.5 Grams    Orange     1 cup = 0.7 Grams    Vegetables:   Cooked: Green Beans   1 cup = 4.0 Grams       Carrots   1/2 cup sliced = 2.3 Grams       Peas       1 cup = 8.8 Grams       Potato (baked, with skin)  1 medium = 3.8 Grams    Raw: Cucumber (with peel)  1 cucumber = 1.5 Grams            Lettuce     1 cup shredded = 0.5 Grams            Tomato   1 medium tomato = 1.5 Grams            Spinach  1 cup = 0.7 Grams    Legumes: Baked beans, canned, no salt added  1 cup = 13.9 Grams         Kidney Beans, canned  1 cup = 13.6 Grams         Pérez Beans, canned     1 cup = 11.6 Grams         Lentils, boiled   1 cup = 15.6 Grams    Breads, Pastas, Flours: Bran muffins   1 medium muffin = 5.2 Grams           Oatmeal, cooked  1 cup = 4.0 Grams           White Bread   1 slice = 0.6 Grams           Whole- wheat bread = 1.9 Grams    Pasta and rice, cooked: Macaroni  1 cup = 2.5 Grams           Rice, Brown  1 cup = 3.5 Grams           Rice, white   1 cup = 0.6 Grams           Spaghetti (regular) 1 cup = 2.5 Grams    Nuts: Almonds   1 cup = 17.4 Grams            Peanuts    1 cup = 12.4 Grams

## 2022-06-16 NOTE — ANESTHESIA CARE TRANSFER NOTE
Patient: Denise Barclay    Procedure: Procedure(s):  COLONOSCOPY, WITH POLYPECTOMY AND BIOPSY       Diagnosis: Colon cancer screening [Z12.11]  Diagnosis Additional Information: No value filed.    Anesthesia Type:   MAC     Note:    Oropharynx: spontaneously breathing and oropharynx clear of all foreign objects  Level of Consciousness: drowsy  Oxygen Supplementation: nasal cannula  Level of Supplemental Oxygen (L/min / FiO2): 3  Independent Airway: airway patency satisfactory and stable  Dentition: dentition unchanged  Vital Signs Stable: post-procedure vital signs reviewed and stable  Report to RN Given: handoff report given  Patient transferred to: Phase II    Handoff Report: Identifed the Patient, Identified the Reponsible Provider, Reviewed the pertinent medical history, Discussed the surgical course, Reviewed Intra-OP anesthesia mangement and issues during anesthesia, Set expectations for post-procedure period and Allowed opportunity for questions and acknowledgement of understanding      Vitals:  Vitals Value Taken Time   BP     Temp     Pulse     Resp     SpO2         Electronically Signed By: Miguel Angel Shea CRNA, APRN CRNA  June 16, 2022  10:51 AM

## 2022-06-16 NOTE — H&P
PRE-PROCEDURE NOTE    CHIEFCOMPLAINT / REASON FOR PROCEDURE:  Screening for polyps and colorectal cancer.    PERTINENT HISTORY   Patient is due for colonoscopy. No  family history of colon polyps or colon cancer.    Past Medical History:   Diagnosis Date     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     unknow cause       Past Surgical History:   Procedure Laterality Date     AS REDUCTION OF LARGE BREAST Bilateral 10/2020    [facility]         Other:  None  Bleeding tendencies: No     Relevant Family History:  None     Relevant Social History:  None     10 point ROS of systems including Constitutional, Eyes, Respiratory, Cardiovascular, Gastroenterology, Genitourinary, Integumentary, Muscularskeletal, Psychiatric were all negative except for pertinent positives noted in my HPI.      ALLERGIES/SENSITIVITIES: No Known Allergies     CURRENT MEDICATIONS:    No current facility-administered medications on file prior to encounter.  calcium citrate-vitamin D (CITRACAL) 315-250 MG-UNIT TABS per tablet, Take by mouth 2 times daily  bisacodyl (DULCOLAX) 5 MG EC tablet, Use as directed per colonoscopy prep.  fish oil-omega-3 fatty acids 1000 MG capsule, Take 1 capsule by mouth daily  ibuprofen (ADVIL/MOTRIN) 800 MG tablet, Take 1 tablet by mouth every 8 hours as needed For pain  Menthol, Topical Analgesic, (BIOFREEZE ROLL-ON) 4 % GEL, Apply topically as needed Apply to right ankle for pain  Multiple Vitamins-Iron TABS, Take 1 tablet by mouth daily  omeprazole (PRILOSEC) 40 MG DR capsule, Take 40 mg by mouth daily as needed For heartburn            PRE-SEDATION ASSESSMENT:    LUNGS:  CTA B/L, no wheezing or crackles.  Heart & CV:  RRR no murmur.  Intact distal pulses, good cap refill.    Comment(s):      IMPRESSION: 72 year old female in need of screening colonoscopy.    PLAN:  I discussed screening colonoscopy with the patient. Anesthesia coverage requested.    Bijan De Dios MD    6/16/2022 9:33 AM

## 2022-06-16 NOTE — ANESTHESIA PREPROCEDURE EVALUATION
Anesthesia Pre-Procedure Evaluation    Patient: Denise Barclay   MRN: [id number] : 1950        Procedure : Procedure(s):  COLONOSCOPY          Past Medical History:   Diagnosis Date     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     unknow cause      Past Surgical History:   Procedure Laterality Date     AS REDUCTION OF LARGE BREAST Bilateral 10/2020    [facility]      No Known Allergies   Social History     Tobacco Use     Smoking status: Never Smoker     Smokeless tobacco: Never Used   Substance Use Topics     Alcohol use: Not Currently      Wt Readings from Last 1 Encounters:   22 101.9 kg (224 lb 9.6 oz)        Anesthesia Evaluation   Pt has had prior anesthetic.     No history of anesthetic complications       ROS/MED HX  ENT/Pulmonary:     (+) CASPER risk factors, obese,     Neurologic:  - neg neurologic ROS     Cardiovascular:       METS/Exercise Tolerance: >4 METS    Hematologic:  - neg hematologic  ROS     Musculoskeletal:  - neg musculoskeletal ROS     GI/Hepatic:     (+) bowel prep,     Renal/Genitourinary:  - neg Renal ROS     Endo:     (+) Obesity,     Psychiatric/Substance Use:  - neg psychiatric ROS     Infectious Disease:  - neg infectious disease ROS     Malignancy:  - neg malignancy ROS     Other:  - neg other ROS          Physical Exam    Airway        Mallampati: III   TM distance: > 3 FB   Neck ROM: full   Mouth opening: > 3 cm    Respiratory Devices and Support         Dental  no notable dental history         Cardiovascular          Rhythm and rate: regular and normal     Pulmonary   pulmonary exam normal        breath sounds clear to auscultation           OUTSIDE LABS:  CBC: No results found for: WBC, HGB, HCT, PLT  BMP: No results found for: NA, POTASSIUM, CHLORIDE, CO2, BUN, CR, GLC  COAGS: No results found for: PTT, INR, FIBR  POC: No results found for: BGM, HCG, HCGS  HEPATIC: No results found for: ALBUMIN, PROTTOTAL, ALT, AST, GGT, ALKPHOS,  BILITOTAL, BILIDIRECT, JOSE  OTHER: No results found for: PH, LACT, A1C, ZAYDA, PHOS, MAG, LIPASE, AMYLASE, TSH, T4, T3, CRP, SED    Anesthesia Plan    ASA Status:  3   NPO Status:  NPO Appropriate    Anesthesia Type: MAC.     - Reason for MAC: straight local not clinically adequate   Induction: Intravenous.           Consents    Anesthesia Plan(s) and associated risks, benefits, and realistic alternatives discussed. Questions answered and patient/representative(s) expressed understanding.     - Discussed: Risks, Benefits and Alternatives for BOTH SEDATION and the PROCEDURE were discussed     - Discussed with:  Patient         Postoperative Care            Comments:                RAJESH GREEN CRNA

## 2022-06-16 NOTE — ANESTHESIA POSTPROCEDURE EVALUATION
Patient: Denise Barclay    Procedure: Procedure(s):  COLONOSCOPY, WITH POLYPECTOMY AND BIOPSY       Anesthesia Type:  MAC    Note:  Disposition: Outpatient   Postop Pain Control: Uneventful            Sign Out: Well controlled pain   PONV: No   Neuro/Psych: Uneventful            Sign Out: Acceptable/Baseline neuro status   Airway/Respiratory: Uneventful            Sign Out: Acceptable/Baseline resp. status   CV/Hemodynamics: Uneventful            Sign Out: Acceptable CV status; No obvious hypovolemia; No obvious fluid overload   Other NRE: NONE   DID A NON-ROUTINE EVENT OCCUR? No           Last vitals:  Vitals Value Taken Time   /78 06/16/22 1115   Temp     Pulse 66 06/16/22 1115   Resp 18 06/16/22 1050   SpO2 95 % 06/16/22 1127   Vitals shown include unvalidated device data.    Electronically Signed By: Miguel Angel Shea CRNA, APRN CRNA  June 16, 2022  11:29 AM

## 2022-06-16 NOTE — OP NOTE
PROCEDURE NOTE    SURGEON:Bijan De Dios MD    PRE-OP DIAGNOSIS:  Screening Colonoscopy      POST-OP DIAGNOSIS: Colon polyps     PROCEDURE:  Colonoscopy with snare    SPECIMEN:      ID Type Source Tests Collected by Time Destination   1 :  Polyp Large Intestine, Colon, Ascending SURGICAL PATHOLOGY EXAM Bijan De Dios MD 6/16/2022 10:35 AM    2 :  Polyp Large Intestine, Colon, Descending SURGICAL PATHOLOGY EXAM Bijan De Dios MD 6/16/2022 10:39 AM    3 :  Polyp Large Intestine, Colon, Sigmoid SURGICAL PATHOLOGY EXAM Bijan De Dios MD 6/16/2022 10:43 AM          ANESTHESIA:  Monitor Anesthesia Care CRNA Independent: Miguel Angel Shea APRN CRNA   Coverage requested    ESTIMATED BLOOD LOSS: none    COMPLICATIONS:  None    INDICATION FOR THE PROCEDURE: The patient is a 72 year old female. The patient presents with hx of polyps. I explained to the patient the risks, benefits and alternatives to screening colonoscopy for evaluating for cancer or polyps. We discussed the risks including bleeding, perforation, potential inability to reach the cecum and the risks of sedation. The patient's questions were answered and the patient wished to proceed. Informed consent paperwork was completed.    PROCEDURE: The patient was taken to the endoscopy suite. Appropriate monitors were attached. The patient was placed in the left lateral decubitus position. Timeout was performed confirming the patient's identity and procedure to be performed.  After appropriate sedation was confirmed, digital rectal exam was performed.  There was normal tone and no gross abnormality was noted.  The lubricated colonoscope was introduced into the anus the colon was insufflated with air. The prep quality was adequate. Under direct visualization the scope was advanced to the cecum. The mucosa of the colon was inspected while withdrawing the scope.  A 2 mm ascending colon polyp was removed with cold snare.  In the descending colon a 9 mm flat polyp was  removed with cold snare.  In the sigmoid a 7 mm pedunculated polyp was removed with a cold snare. The scope was retroflexed in the rectum and the anorectal junction was inspected. No abnormalities were noted. The scope was returned to aneutral position and the colon was decompressed. The scope was removed. The patient tolerated the procedure with no immediately apparent complication. The patient was taken to recovery in stable condition.    FOLLOW UP: RECOMMEND high fiber diet, will call with pathology results.   Bijan De Dios MD on 6/16/2022 at 10:48 AM

## 2022-06-16 NOTE — OR NURSING
Patient and responsible adult given discharge instructions with no questions regarding instructions. Chayo score 18. Pain level 0/10.  Discharged from unit via ambulatory. Patient discharged to home.

## 2022-06-20 LAB
PATH REPORT.COMMENTS IMP SPEC: NORMAL
PATH REPORT.FINAL DX SPEC: NORMAL
PATH REPORT.RELEVANT HX SPEC: NORMAL
PHOTO IMAGE: NORMAL

## 2022-07-26 ENCOUNTER — TELEPHONE (OUTPATIENT)
Dept: FAMILY MEDICINE | Facility: OTHER | Age: 72
End: 2022-07-26

## 2022-07-26 DIAGNOSIS — M70.62 TROCHANTERIC BURSITIS OF LEFT HIP: Primary | ICD-10-CM

## 2022-07-26 NOTE — TELEPHONE ENCOUNTER
Patient is needing a referral for her back injection.   Please call the patient back.  Thank you   Maisha Martinez on 7/26/2022 at 3:09 PM

## 2022-07-26 NOTE — TELEPHONE ENCOUNTER
Called patient regarding back injection. Patient stated she would like a trichontaric bursa injection for left hip. She has received this injection every 4 months for the last 6 years through [facility].Patient states her chart has been sent over and is requested we order them for her. Patient has received her injections at [facility] and would like to continue here at [facility] as it is local to her. Order has been stacia 'd up and pended to PCP.  Gwendolyn Robertson LPN........................7/26/2022  4:31 PM

## 2022-08-02 ENCOUNTER — TELEPHONE (OUTPATIENT)
Dept: FAMILY MEDICINE | Facility: OTHER | Age: 72
End: 2022-08-02

## 2022-08-02 NOTE — TELEPHONE ENCOUNTER
AAR-patient is looking for an order to have an injection in her back    Please call and advise    Thank You    Graciela Lang on 8/2/2022 at 2:14 PM

## 2022-08-03 NOTE — TELEPHONE ENCOUNTER
I received a previous note saying she needed a referral for the injection.  That order has been placed.

## 2022-08-04 ENCOUNTER — TELEPHONE (OUTPATIENT)
Dept: FAMILY MEDICINE | Facility: OTHER | Age: 72
End: 2022-08-04

## 2022-09-07 ENCOUNTER — HOSPITAL ENCOUNTER (OUTPATIENT)
Dept: GENERAL RADIOLOGY | Facility: OTHER | Age: 72
Discharge: HOME | End: 2022-09-07
Attending: FAMILY MEDICINE
Payer: COMMERCIAL

## 2022-09-07 ENCOUNTER — OFFICE VISIT (OUTPATIENT)
Dept: FAMILY MEDICINE | Facility: OTHER | Age: 72
End: 2022-09-07
Attending: STUDENT IN AN ORGANIZED HEALTH CARE EDUCATION/TRAINING PROGRAM
Payer: COMMERCIAL

## 2022-09-07 VITALS
SYSTOLIC BLOOD PRESSURE: 134 MMHG | RESPIRATION RATE: 18 BRPM | HEIGHT: 63 IN | DIASTOLIC BLOOD PRESSURE: 84 MMHG | WEIGHT: 221.13 LBS | OXYGEN SATURATION: 97 % | TEMPERATURE: 98.5 F | BODY MASS INDEX: 39.18 KG/M2 | HEART RATE: 76 BPM

## 2022-09-07 DIAGNOSIS — M70.62 TROCHANTERIC BURSITIS OF LEFT HIP: ICD-10-CM

## 2022-09-07 DIAGNOSIS — M25.552 LEFT HIP PAIN: ICD-10-CM

## 2022-09-07 DIAGNOSIS — M70.62 TROCHANTERIC BURSITIS OF LEFT HIP: Primary | ICD-10-CM

## 2022-09-07 PROCEDURE — 99213 OFFICE O/P EST LOW 20 MIN: CPT | Performed by: STUDENT IN AN ORGANIZED HEALTH CARE EDUCATION/TRAINING PROGRAM

## 2022-09-07 PROCEDURE — 77002 NEEDLE LOCALIZATION BY XRAY: CPT

## 2022-09-07 PROCEDURE — 255N000002 HC RX 255 OP 636: Performed by: FAMILY MEDICINE

## 2022-09-07 PROCEDURE — G0463 HOSPITAL OUTPT CLINIC VISIT: HCPCS

## 2022-09-07 PROCEDURE — G0463 HOSPITAL OUTPT CLINIC VISIT: HCPCS | Mod: 25

## 2022-09-07 PROCEDURE — 250N000009 HC RX 250: Performed by: FAMILY MEDICINE

## 2022-09-07 PROCEDURE — 250N000011 HC RX IP 250 OP 636: Performed by: FAMILY MEDICINE

## 2022-09-07 RX ORDER — LIDOCAINE HYDROCHLORIDE 10 MG/ML
20 INJECTION, SOLUTION INFILTRATION; PERINEURAL ONCE
Status: COMPLETED | OUTPATIENT
Start: 2022-09-07 | End: 2022-09-07

## 2022-09-07 RX ORDER — BUPIVACAINE HYDROCHLORIDE 5 MG/ML
10 INJECTION, SOLUTION EPIDURAL; INTRACAUDAL ONCE
Status: COMPLETED | OUTPATIENT
Start: 2022-09-07 | End: 2022-09-07

## 2022-09-07 RX ORDER — TRIAMCINOLONE ACETONIDE 40 MG/ML
40 INJECTION, SUSPENSION INTRA-ARTICULAR; INTRAMUSCULAR ONCE
Status: COMPLETED | OUTPATIENT
Start: 2022-09-07 | End: 2022-09-07

## 2022-09-07 RX ADMIN — LIDOCAINE HYDROCHLORIDE 4 ML: 10 INJECTION, SOLUTION INFILTRATION; PERINEURAL at 12:15

## 2022-09-07 RX ADMIN — BUPIVACAINE HYDROCHLORIDE 3 ML: 5 INJECTION, SOLUTION EPIDURAL; INTRACAUDAL; PERINEURAL at 12:15

## 2022-09-07 RX ADMIN — IOHEXOL 2 ML: 240 INJECTION, SOLUTION INTRATHECAL; INTRAVASCULAR; INTRAVENOUS; ORAL at 12:14

## 2022-09-07 RX ADMIN — TRIAMCINOLONE ACETONIDE 40 MG: 40 INJECTION, SUSPENSION INTRA-ARTICULAR; INTRAMUSCULAR at 12:16

## 2022-09-07 ASSESSMENT — ANXIETY QUESTIONNAIRES
5. BEING SO RESTLESS THAT IT IS HARD TO SIT STILL: NOT AT ALL
8. IF YOU CHECKED OFF ANY PROBLEMS, HOW DIFFICULT HAVE THESE MADE IT FOR YOU TO DO YOUR WORK, TAKE CARE OF THINGS AT HOME, OR GET ALONG WITH OTHER PEOPLE?: NOT DIFFICULT AT ALL
7. FEELING AFRAID AS IF SOMETHING AWFUL MIGHT HAPPEN: NOT AT ALL
IF YOU CHECKED OFF ANY PROBLEMS ON THIS QUESTIONNAIRE, HOW DIFFICULT HAVE THESE PROBLEMS MADE IT FOR YOU TO DO YOUR WORK, TAKE CARE OF THINGS AT HOME, OR GET ALONG WITH OTHER PEOPLE: NOT DIFFICULT AT ALL
GAD7 TOTAL SCORE: 0
4. TROUBLE RELAXING: NOT AT ALL
2. NOT BEING ABLE TO STOP OR CONTROL WORRYING: NOT AT ALL
GAD7 TOTAL SCORE: 0
6. BECOMING EASILY ANNOYED OR IRRITABLE: NOT AT ALL
GAD7 TOTAL SCORE: 0
3. WORRYING TOO MUCH ABOUT DIFFERENT THINGS: NOT AT ALL
1. FEELING NERVOUS, ANXIOUS, OR ON EDGE: NOT AT ALL
7. FEELING AFRAID AS IF SOMETHING AWFUL MIGHT HAPPEN: NOT AT ALL

## 2022-09-07 ASSESSMENT — PATIENT HEALTH QUESTIONNAIRE - PHQ9
SUM OF ALL RESPONSES TO PHQ QUESTIONS 1-9: 0
10. IF YOU CHECKED OFF ANY PROBLEMS, HOW DIFFICULT HAVE THESE PROBLEMS MADE IT FOR YOU TO DO YOUR WORK, TAKE CARE OF THINGS AT HOME, OR GET ALONG WITH OTHER PEOPLE: NOT DIFFICULT AT ALL
SUM OF ALL RESPONSES TO PHQ QUESTIONS 1-9: 0

## 2022-09-07 ASSESSMENT — PAIN SCALES - GENERAL: PAINLEVEL: EXTREME PAIN (8)

## 2022-09-07 NOTE — PROGRESS NOTES
"  Assessment & Plan     Trochanteric bursitis of left hip  Filled out handicap sticker information. Order placed for steroid injection so she can schedule it in December before she travels to [place]   - XR Joint Injection Major Left; Future      Bryant Rodriguez MD  [facility]    [facility] is a 72 year old, presenting for the following health issues:  Forms (Renew handicapped parking certificate)      History of Present Illness       Reason for visit:  Update handicap permit    She eats 2-3 servings of fruits and vegetables daily.She consumes 0 sweetened beverage(s) daily.She exercises with enough effort to increase her heart rate 9 or less minutes per day.  She exercises with enough effort to increase her heart rate 4 days per week.   She is taking medications regularly.    Today's PHQ-9         PHQ-9 Total Score: 0    PHQ-9 Q9 Thoughts of better off dead/self-harm past 2 weeks :   Not at all    How difficult have these problems made it for you to do your work, take care of things at home, or get along with other people: Not difficult at all  Today's FLORENTIN-7 Score: 0     Handicap sticker  - due for renewal   - as left hip bursitis that flares.   - gets injections about 3 times a year   - unable to walk more than 200 feet   - uses a walker for assistance  - no other medical issues that would affect driving     Left hip bursitis   - will be due for an injection in Dec before she leaves for [place]  - wants order now so she can get it scheduled   - is getting her injection today            Review of Systems   Constitutional, HEENT, cardiovascular, pulmonary, gi and gu systems are negative, except as otherwise noted.      Objective    /84 (BP Location: Right arm, Patient Position: Sitting, Cuff Size: Adult Large)   Pulse 76   Temp 98.5  F (36.9  C) (Tympanic)   Resp 18   Ht 1.6 m (5' 3\")   Wt 100.3 kg (221 lb 2 oz)   LMP  (LMP Unknown)   SpO2 97%   Breastfeeding No   " BMI 39.17 kg/m    Body mass index is 39.17 kg/m .  Physical Exam   GENERAL: healthy, alert and no distress  NEURO: Normal strength and tone, mentation intact and speech normal  PSYCH: mentation appears normal, affect normal/bright                    [unfilled]  @[facility]@

## 2022-09-07 NOTE — NURSING NOTE
Patient presents to clinic for renewal of handicapped parking certificate.    Medication Rec Complete  Malina Latham LPN............9/7/2022 10:52 AM

## 2022-10-05 ENCOUNTER — TELEPHONE (OUTPATIENT)
Dept: FAMILY MEDICINE | Facility: OTHER | Age: 72
End: 2022-10-05

## 2022-10-05 DIAGNOSIS — R52 MODERATE PAIN: ICD-10-CM

## 2022-10-05 DIAGNOSIS — K21.9 GASTROESOPHAGEAL REFLUX DISEASE WITHOUT ESOPHAGITIS: Primary | ICD-10-CM

## 2022-10-05 RX ORDER — OMEPRAZOLE 40 MG/1
40 CAPSULE, DELAYED RELEASE ORAL DAILY PRN
Qty: 90 CAPSULE | Refills: 3 | Status: SHIPPED | OUTPATIENT
Start: 2022-10-05 | End: 2023-09-11

## 2022-10-05 RX ORDER — IBUPROFEN 800 MG/1
800 TABLET, FILM COATED ORAL EVERY 8 HOURS PRN
Qty: 90 TABLET | Refills: 3 | Status: SHIPPED | OUTPATIENT
Start: 2022-10-05 | End: 2023-09-11 | Stop reason: ALTCHOICE

## 2022-10-05 NOTE — TELEPHONE ENCOUNTER
Patient leaves for [place] in December.  Please send refills to [facility].    Malina Latham LPN............10/5/2022 3:27 PM

## 2022-10-05 NOTE — TELEPHONE ENCOUNTER
KWA-patient would like to make sure her medications are good for the winter on refills and she is not taking gabapentin    Please call advise  Thank You    Graciela Lang on 10/5/2022 at 2:24 PM

## 2022-10-06 ENCOUNTER — TELEPHONE (OUTPATIENT)
Dept: SCHEDULING | Facility: OTHER | Age: 72
End: 2022-10-06

## 2022-10-06 NOTE — TELEPHONE ENCOUNTER
Patient called requesting that her ibuprofen prescription be sent to [email or web address].  Patient was informed that Dr. Jeancarlos Rodriguez filled it yesterday.  Patient stated she would call [facility] to follow up and ensure it had been received.    Ashley Boone on 10/6/2022 at 9:08 AM

## 2022-10-12 ENCOUNTER — IMMUNIZATION (OUTPATIENT)
Dept: FAMILY MEDICINE | Facility: OTHER | Age: 72
End: 2022-10-12
Attending: FAMILY MEDICINE
Payer: COMMERCIAL

## 2022-10-12 DIAGNOSIS — Z23 HIGH PRIORITY FOR 2019-NCOV VACCINE: ICD-10-CM

## 2022-10-12 DIAGNOSIS — Z23 NEED FOR PROPHYLACTIC VACCINATION AND INOCULATION AGAINST INFLUENZA: Primary | ICD-10-CM

## 2022-10-12 PROCEDURE — G0008 ADMIN INFLUENZA VIRUS VAC: HCPCS

## 2022-10-12 PROCEDURE — 91312 COVID-19,PF,PFIZER BOOSTER BIVALENT: CPT

## 2022-10-28 ENCOUNTER — TELEPHONE (OUTPATIENT)
Dept: FAMILY MEDICINE | Facility: OTHER | Age: 72
End: 2022-10-28

## 2022-10-28 DIAGNOSIS — M70.62 TROCHANTERIC BURSITIS OF LEFT HIP: ICD-10-CM

## 2022-10-28 NOTE — TELEPHONE ENCOUNTER
KWA - Patient is wanting to have a hip injection for 12/16/2022.  She needs an order for triconteric bursa injection faxed to [phone] as soon as possible.  It is going to [facility] in [place].    She asks that the nurse call her as soon as the order is faxed.    Thank you,  Ashley Boone on 10/28/2022 at 12:34 PM'

## 2022-10-28 NOTE — TELEPHONE ENCOUNTER
Inquired with [facility] here in our building if they can share information/orders with [facility] in [place].  They do not share information between the two companies.  MRI order released and printed.  Will fax at this time to [facility] in [place].    Patient notified fax has been sent.    Malina Latham LPN............10/28/2022 4:04 PM

## 2022-12-21 ENCOUNTER — TRANSFERRED RECORDS (OUTPATIENT)
Dept: HEALTH INFORMATION MANAGEMENT | Facility: OTHER | Age: 72
End: 2022-12-21

## 2022-12-21 LAB
EJECTION FRACTION: NORMAL %
INR (EXTERNAL): 0.96 (ref 0.85–1.14)

## 2022-12-22 ENCOUNTER — TRANSFERRED RECORDS (OUTPATIENT)
Dept: HEALTH INFORMATION MANAGEMENT | Facility: OTHER | Age: 72
End: 2022-12-22

## 2022-12-22 LAB
CHOLESTEROL (EXTERNAL): 208 MG/DL (ref 120–200)
HBA1C MFR BLD: 6.3 % (ref 4.4–6.4)
HDLC SERPL-MCNC: 33 MG/DL (ref 35–60)
LDL CHOLESTEROL CALCULATED (EXTERNAL): 143 MG/DL (ref 0–130)
TRIGLYCERIDES (EXTERNAL): 241 MG/DL (ref 30–200)
TSH SERPL-ACNC: 3.5 (ref 0.4–4)

## 2022-12-24 ENCOUNTER — TRANSFERRED RECORDS (OUTPATIENT)
Dept: HEALTH INFORMATION MANAGEMENT | Facility: OTHER | Age: 72
End: 2022-12-24

## 2022-12-24 LAB
ALT SERPL-CCNC: 21 U/L (ref 10–30)
AST SERPL-CCNC: 16 U/L (ref 15–37)
CREATININE (EXTERNAL): 1.1 MG/DL (ref 0.6–1)
GFR ESTIMATED (EXTERNAL): 53 ML/MIN/1.73M2
GLUCOSE (EXTERNAL): 113 MG/DL (ref 65–99)
POTASSIUM (EXTERNAL): 3.9 MMOL/L (ref 3.5–5.1)

## 2023-03-17 ENCOUNTER — TELEPHONE (OUTPATIENT)
Dept: FAMILY MEDICINE | Facility: OTHER | Age: 73
End: 2023-03-17
Payer: COMMERCIAL

## 2023-03-17 DIAGNOSIS — M70.62 TROCHANTERIC BURSITIS OF LEFT HIP: Primary | ICD-10-CM

## 2023-03-17 NOTE — TELEPHONE ENCOUNTER
Patient needs a hip injection order for [facility].  She would like it sent to the same place you sent the one in December, per ptDalila Maya on 3/17/2023 at 12:03 PM

## 2023-03-23 ENCOUNTER — TELEPHONE (OUTPATIENT)
Dept: FAMILY MEDICINE | Facility: OTHER | Age: 73
End: 2023-03-23
Payer: COMMERCIAL

## 2023-03-23 NOTE — TELEPHONE ENCOUNTER
"[facility]  states Injection order placed on 03/20/23 is in Prior Authorization status.  She did resend to [facility] in [place].  Patient notified of status and stated, \"I go to [place] because it is cheaper than in [facility].\"    Malina Latham LPN............3/23/2023 11:20 AM    "

## 2023-03-23 NOTE — TELEPHONE ENCOUNTER
Patient called and requested a call back regarding if her order for an injection shot was sent to [facility] in [facility] to leave detailed message.    Vikki Dawkins on 3/23/2023 at 11:10 AM

## 2023-03-29 ENCOUNTER — TELEPHONE (OUTPATIENT)
Dept: FAMILY MEDICINE | Facility: OTHER | Age: 73
End: 2023-03-29
Payer: COMMERCIAL

## 2023-03-29 NOTE — TELEPHONE ENCOUNTER
Spoke with patient who states when she arrived down in [place] last December, she was hospitalized for several days for a pulmonary embolism. States she did see cardiology and pulmonology while down there. Reports she was prescribed eliquis 5 mg twice daily and ramipril 5 mg daily. She has returned to [place] and was instructed she needs to followup with local provider for refills. States at this time, she has enough medication until first week in May. Patient states she did sign a release of information to have records sent here, but it does not appear these have arrived yet.     It was advised patient setup appointment to see local provider in regards to medication/ health changes. Patient requesting recommendations from provider if appointment is needed or if prescriptions can be prescribed without appointment (will bring in prescriptions/ discharge paperwork from hospital)    Please advise    Corinne R Thayer, RN on 3/29/2023 at 12:09 PM

## 2023-03-29 NOTE — TELEPHONE ENCOUNTER
Patient was called and was instructed she needs an appointment for future refills- was transferred to appointment line to set this up for 4/5/23    Corinne R Thayer, RN on 3/29/2023 at 1:05 PM

## 2023-03-29 NOTE — TELEPHONE ENCOUNTER
Reason for call: Medication or medication refill    Name of medication requested: eliquis 5 MG and ramipril 5mg    Are you out of the medication? No    What pharmacy do you use? [facility]    Preferred method for responding to this message: Telephone Call    Phone number patient can be reached at: Cell number on file:    Telephone Information:   Mobile [phone]       If we cannot reach you directly, may we leave a detailed response at the number you provided? Yes     Patient stated she has returned from [place] and need these prescriptions refills sent to [facility] (her normal pharmacy while in [place].) Patient stated she had her records from [place] sent to [facility]. Patient stated she is okay with setting up an appointment if needed.    Vikki Dawkins on 3/29/2023 at 11:43 AM

## 2023-04-05 ENCOUNTER — TELEPHONE (OUTPATIENT)
Dept: FAMILY MEDICINE | Facility: OTHER | Age: 73
End: 2023-04-05
Payer: COMMERCIAL

## 2023-04-05 RX ORDER — RAMIPRIL 5 MG/1
CAPSULE ORAL
COMMUNITY
Start: 2023-02-02 | End: 2023-04-12

## 2023-04-05 RX ORDER — APIXABAN 5 MG/1
1 TABLET, FILM COATED ORAL
COMMUNITY
Start: 2023-03-23 | End: 2023-04-12

## 2023-04-05 NOTE — TELEPHONE ENCOUNTER
Patient cancelled office visit with provider.  Assisted in rescheduling for Wednesday 04/12 at 2:00 for discussion on medications.    Malina Latham LPN............4/5/2023 9:26 AM

## 2023-04-12 ENCOUNTER — OFFICE VISIT (OUTPATIENT)
Dept: FAMILY MEDICINE | Facility: OTHER | Age: 73
End: 2023-04-12
Attending: STUDENT IN AN ORGANIZED HEALTH CARE EDUCATION/TRAINING PROGRAM
Payer: COMMERCIAL

## 2023-04-12 VITALS
RESPIRATION RATE: 20 BRPM | TEMPERATURE: 98.4 F | OXYGEN SATURATION: 98 % | HEIGHT: 63 IN | WEIGHT: 226 LBS | DIASTOLIC BLOOD PRESSURE: 90 MMHG | BODY MASS INDEX: 40.04 KG/M2 | HEART RATE: 68 BPM | SYSTOLIC BLOOD PRESSURE: 140 MMHG

## 2023-04-12 DIAGNOSIS — Z86.711 HISTORY OF PULMONARY EMBOLISM: Primary | ICD-10-CM

## 2023-04-12 DIAGNOSIS — Z86.718 PERSONAL HISTORY OF DVT (DEEP VEIN THROMBOSIS): ICD-10-CM

## 2023-04-12 DIAGNOSIS — I10 PRIMARY HYPERTENSION: ICD-10-CM

## 2023-04-12 LAB
ALBUMIN SERPL BCG-MCNC: 4.1 G/DL (ref 3.5–5.2)
ALBUMIN UR-MCNC: NEGATIVE MG/DL
ALP SERPL-CCNC: 99 U/L (ref 35–104)
ALT SERPL W P-5'-P-CCNC: 19 U/L (ref 10–35)
ANION GAP SERPL CALCULATED.3IONS-SCNC: 7 MMOL/L (ref 7–15)
APPEARANCE UR: ABNORMAL
AST SERPL W P-5'-P-CCNC: 20 U/L (ref 10–35)
BACTERIA #/AREA URNS HPF: ABNORMAL /HPF
BILIRUB SERPL-MCNC: 0.2 MG/DL
BILIRUB UR QL STRIP: NEGATIVE
BUN SERPL-MCNC: 27.8 MG/DL (ref 8–23)
CALCIUM SERPL-MCNC: 9 MG/DL (ref 8.8–10.2)
CHLORIDE SERPL-SCNC: 103 MMOL/L (ref 98–107)
COLOR UR AUTO: ABNORMAL
CREAT SERPL-MCNC: 1.52 MG/DL (ref 0.51–0.95)
DEPRECATED HCO3 PLAS-SCNC: 29 MMOL/L (ref 22–29)
GFR SERPL CREATININE-BSD FRML MDRD: 36 ML/MIN/1.73M2
GLUCOSE SERPL-MCNC: 100 MG/DL (ref 70–99)
GLUCOSE UR STRIP-MCNC: NEGATIVE MG/DL
HGB UR QL STRIP: ABNORMAL
HOLD SPECIMEN: NORMAL
KETONES UR STRIP-MCNC: NEGATIVE MG/DL
LEUKOCYTE ESTERASE UR QL STRIP: ABNORMAL
MUCOUS THREADS #/AREA URNS LPF: PRESENT /LPF
NITRATE UR QL: NEGATIVE
PH UR STRIP: 5.5 [PH] (ref 5–9)
POTASSIUM SERPL-SCNC: 5.5 MMOL/L (ref 3.4–5.3)
PROT SERPL-MCNC: 6.9 G/DL (ref 6.4–8.3)
RBC URINE: 4 /HPF
SODIUM SERPL-SCNC: 139 MMOL/L (ref 136–145)
SP GR UR STRIP: 1.01 (ref 1–1.03)
SQUAMOUS EPITHELIAL: 6 /HPF
UROBILINOGEN UR STRIP-MCNC: NORMAL MG/DL
WBC URINE: 15 /HPF

## 2023-04-12 PROCEDURE — 87086 URINE CULTURE/COLONY COUNT: CPT | Mod: ZL | Performed by: STUDENT IN AN ORGANIZED HEALTH CARE EDUCATION/TRAINING PROGRAM

## 2023-04-12 PROCEDURE — 99214 OFFICE O/P EST MOD 30 MIN: CPT | Performed by: STUDENT IN AN ORGANIZED HEALTH CARE EDUCATION/TRAINING PROGRAM

## 2023-04-12 PROCEDURE — 80053 COMPREHEN METABOLIC PANEL: CPT | Mod: ZL | Performed by: STUDENT IN AN ORGANIZED HEALTH CARE EDUCATION/TRAINING PROGRAM

## 2023-04-12 PROCEDURE — G0463 HOSPITAL OUTPT CLINIC VISIT: HCPCS

## 2023-04-12 PROCEDURE — 81001 URINALYSIS AUTO W/SCOPE: CPT | Mod: ZL | Performed by: STUDENT IN AN ORGANIZED HEALTH CARE EDUCATION/TRAINING PROGRAM

## 2023-04-12 PROCEDURE — 36415 COLL VENOUS BLD VENIPUNCTURE: CPT | Mod: ZL | Performed by: STUDENT IN AN ORGANIZED HEALTH CARE EDUCATION/TRAINING PROGRAM

## 2023-04-12 RX ORDER — AMLODIPINE BESYLATE 5 MG/1
5 TABLET ORAL DAILY
Qty: 30 TABLET | Refills: 3 | Status: SHIPPED | OUTPATIENT
Start: 2023-04-12 | End: 2023-04-13

## 2023-04-12 RX ORDER — APIXABAN 5 MG/1
5 TABLET, FILM COATED ORAL
Qty: 180 TABLET | Refills: 3 | Status: SHIPPED | OUTPATIENT
Start: 2023-04-12 | End: 2023-08-10

## 2023-04-12 RX ORDER — RAMIPRIL 10 MG/1
10 CAPSULE ORAL DAILY
Qty: 90 CAPSULE | Refills: 3 | Status: SHIPPED | OUTPATIENT
Start: 2023-04-12 | End: 2023-04-26

## 2023-04-12 ASSESSMENT — PAIN SCALES - GENERAL: PAINLEVEL: NO PAIN (0)

## 2023-04-12 NOTE — NURSING NOTE
Patient presents to clinic for follow up with medication management.    Medication Rec Complete  Malina Latham LPN............4/12/2023 2:06 PM

## 2023-04-12 NOTE — PROGRESS NOTES
"  Assessment & Plan     History of pulmonary embolism  Personal history of DVT (deep vein thrombosis)  At this point she has had a previous DVT, unsure if provoked or not. Now a recent PE and again does not sound provoked. Patient would appreciated consult with hematology to discuss further work up or if lifelong anticoagulation is needed. From my stand point it sounds like 2 unprovoked incidences and should remain anticoagulated indefinitely   - Adult Oncology/Hematology  Referral; Future  - ELIQUIS ANTICOAGULANT 5 MG tablet; Take 1 tablet (5 mg) by mouth 2 times daily    Primary hypertension  Not well controlled. increase ramipril from 5 mg faily to 10 mg daily   - Comprehensive Metabolic Panel; Future  - ramipril (ALTACE) 10 MG capsule; Take 1 capsule (10 mg) by mouth daily  - UA reflex to Microscopic and Culture; Future  - UA reflex to Microscopic and Culture  - Comprehensive Metabolic Panel  - Urine Culture      Addendum: worsening creatinine and GFR. Advised to stop ramipril. New rx for amlodipine sent to start. Potassium elevated at 5.5 likely due to ace-I. Repeat labs and bp check in clinic in 2 weeks            BMI:   Estimated body mass index is 40.03 kg/m  as calculated from the following:    Height as of this encounter: 1.6 m (5' 3\").    Weight as of this encounter: 102.5 kg (226 lb).           No follow-ups on file.    Bryant Rodriguez MD  [facility] AND [facility]   Denise is a 72 year old, presenting for the following health issues:  Medication Follow-up (Medication management)         View : No data to display.              History of Present Illness       Reason for visit:  Update meds    She eats 2-3 servings of fruits and vegetables daily.She consumes 0 sweetened beverage(s) daily.She exercises with enough effort to increase her heart rate 60 or more minutes per day.  She exercises with enough effort to increase her heart rate 4 days per week.   She is taking " "medications regularly.       Discuss anticoagulation   - admitted in Dec 2022 in [place] for a PE  - she was told they though maybe from driving/sitting but had no DVT on US exam  - she has a history of previous DVT years ago   - she wants to stop or come off of eliquis if able  - no previous anticaog work up  - we do not have records from her [place] hospitalization so she is unsure what work up they did   - was told she had proteins in her blood.       HTN  - started on ramipril at a follow up visit in [place]  - no tobacco, alcohol, or drug use. Minimal salt use            Review of Systems   Constitutional, HEENT, cardiovascular, pulmonary, gi and gu systems are negative, except as otherwise noted.      Objective    BP (!) 148/92 (BP Location: Right arm, Patient Position: Sitting, Cuff Size: Adult Large)   Pulse 68   Temp 98.4  F (36.9  C) (Tympanic)   Resp 20   Ht 1.6 m (5' 3\")   Wt 102.5 kg (226 lb)   LMP  (LMP Unknown)   SpO2 98%   BMI 40.03 kg/m    Body mass index is 40.03 kg/m .  Physical Exam   GENERAL: healthy, alert and no distress  RESP: lungs clear to auscultation - no rales, rhonchi or wheezes  CV: regular rate and rhythm, normal S1 S2, no S3 or S4, no murmur, click or rub, no peripheral edema and peripheral pulses strong  ABDOMEN: soft, nontender, no hepatosplenomegaly, no masses and bowel sounds normal  MS: no gross musculoskeletal defects noted, no edema  SKIN: no suspicious lesions or rashes  PSYCH: mentation appears normal, affect normal/bright    Results for orders placed or performed in visit on 04/12/23 (from the past 24 hour(s))   UA reflex to Microscopic and Culture    Specimen: Urine, Midstream   Result Value Ref Range    Color Urine Light Yellow Colorless, Straw, Light Yellow, Yellow    Appearance Urine Slightly Cloudy (A) Clear    Glucose Urine Negative Negative mg/dL    Bilirubin Urine Negative Negative    Ketones Urine Negative Negative mg/dL    Specific Gravity Urine 1.015 1.000 - " 1.030    Blood Urine Trace (A) Negative    pH Urine 5.5 5.0 - 9.0    Protein Albumin Urine Negative Negative mg/dL    Urobilinogen Urine Normal Normal, 2.0 mg/dL    Nitrite Urine Negative Negative    Leukocyte Esterase Urine Large (A) Negative    Bacteria Urine Few (A) None Seen /HPF    Mucus Urine Present (A) None Seen /LPF    RBC Urine 4 (H) <=2 /HPF    WBC Urine 15 (H) <=5 /HPF    Squamous Epithelials Urine 6 (H) <=1 /HPF    Narrative    Urine Culture ordered based on laboratory criteria   Extra Tube    Narrative    The following orders were created for panel order Extra Tube.  Procedure                               Abnormality         Status                     ---------                               -----------         ------                     Extra Serum Separator Tu...[[id number]]                      In process                   Please view results for these tests on the individual orders.

## 2023-04-12 NOTE — RESULT ENCOUNTER NOTE
Please call with results:   Kidney function worsening and potassium slightly elevated. Stop ramipril. I sent amlodipine a different blood pressure mediation for her to start taking. Keep hydrated. We should follow up in clinic in about 2 weeks for repeat labs and check BP. Urine looks like it might be infected-- any UTI symptoms? Urine culture in process

## 2023-04-13 ENCOUNTER — TELEPHONE (OUTPATIENT)
Dept: FAMILY MEDICINE | Facility: OTHER | Age: 73
End: 2023-04-13
Payer: COMMERCIAL

## 2023-04-13 RX ORDER — AMLODIPINE BESYLATE 5 MG/1
5 TABLET ORAL DAILY
Qty: 30 TABLET | Refills: 3 | Status: SHIPPED | OUTPATIENT
Start: 2023-04-13 | End: 2023-04-26

## 2023-04-13 NOTE — TELEPHONE ENCOUNTER
Please call the patient.   [facility] told her one of her medications is not able to be filled for a few weeks.  She may be able to get 7-10 days.       Kimberly Ferris on 4/13/2023 at 11:58 AM

## 2023-04-13 NOTE — TELEPHONE ENCOUNTER
[facility] pharmacist will give patient 10 tablets to start the Amlodipine 5mg medication.  If patient needs more prior to receiving [facility] order, they will provide.  Patient notified and verbally understood.     Malina Latham LPN............4/13/2023 12:58 PM

## 2023-04-13 NOTE — TELEPHONE ENCOUNTER
Patient asked for confirmation on the Amlodipine being sent to local pharmacy.  It was sent an hour ago.  Patient transferred to appointment line to make her 2 week follow up with Dr. Petersen.      Isha Wyatt LPN 4/13/2023 11:51 AM

## 2023-04-14 LAB — BACTERIA UR CULT: NORMAL

## 2023-04-14 NOTE — RESULT ENCOUNTER NOTE
Team - please call patient with results.  Urine culture is not growing out any bacteria/no infection. We can continue to monitor

## 2023-04-17 ENCOUNTER — TELEPHONE (OUTPATIENT)
Dept: ONCOLOGY | Facility: OTHER | Age: 73
End: 2023-04-17
Payer: COMMERCIAL

## 2023-04-17 NOTE — TELEPHONE ENCOUNTER
Oncology/Hematology Care Coordination - Referral Review    Referred by:  Dr. Jeancarlos Rodriguez    Diagnosis:  Hx DVT and PE. Started on Eliquis    Imaging:  CXR 12/21/22,venous doppler ultrasound 12/21/22,lung perfusion 12/21/22,echo 12/21/22, stress testing also done.     Lab:  12/21/22    Surgery/Biopsy:      Pathology:      Outside Records:  Records from [facility] record fax # [phone] (received)   Tara Knight RN on 4/17/2023 at 10:42 AM

## 2023-04-26 ENCOUNTER — OFFICE VISIT (OUTPATIENT)
Dept: FAMILY MEDICINE | Facility: OTHER | Age: 73
End: 2023-04-26
Attending: STUDENT IN AN ORGANIZED HEALTH CARE EDUCATION/TRAINING PROGRAM
Payer: COMMERCIAL

## 2023-04-26 VITALS
OXYGEN SATURATION: 97 % | HEIGHT: 63 IN | HEART RATE: 73 BPM | SYSTOLIC BLOOD PRESSURE: 138 MMHG | WEIGHT: 226.25 LBS | RESPIRATION RATE: 20 BRPM | TEMPERATURE: 97.2 F | BODY MASS INDEX: 40.09 KG/M2 | DIASTOLIC BLOOD PRESSURE: 76 MMHG

## 2023-04-26 DIAGNOSIS — I10 PRIMARY HYPERTENSION: Primary | ICD-10-CM

## 2023-04-26 LAB
ANION GAP SERPL CALCULATED.3IONS-SCNC: 8 MMOL/L (ref 7–15)
BUN SERPL-MCNC: 26.2 MG/DL (ref 8–23)
CALCIUM SERPL-MCNC: 9.2 MG/DL (ref 8.8–10.2)
CHLORIDE SERPL-SCNC: 103 MMOL/L (ref 98–107)
CREAT SERPL-MCNC: 1.35 MG/DL (ref 0.51–0.95)
DEPRECATED HCO3 PLAS-SCNC: 25 MMOL/L (ref 22–29)
GFR SERPL CREATININE-BSD FRML MDRD: 42 ML/MIN/1.73M2
GLUCOSE SERPL-MCNC: 108 MG/DL (ref 70–99)
POTASSIUM SERPL-SCNC: 5.2 MMOL/L (ref 3.4–5.3)
SODIUM SERPL-SCNC: 136 MMOL/L (ref 136–145)

## 2023-04-26 PROCEDURE — 80048 BASIC METABOLIC PNL TOTAL CA: CPT | Mod: ZL | Performed by: STUDENT IN AN ORGANIZED HEALTH CARE EDUCATION/TRAINING PROGRAM

## 2023-04-26 PROCEDURE — 99213 OFFICE O/P EST LOW 20 MIN: CPT | Performed by: STUDENT IN AN ORGANIZED HEALTH CARE EDUCATION/TRAINING PROGRAM

## 2023-04-26 PROCEDURE — G0463 HOSPITAL OUTPT CLINIC VISIT: HCPCS

## 2023-04-26 PROCEDURE — 36415 COLL VENOUS BLD VENIPUNCTURE: CPT | Mod: ZL | Performed by: STUDENT IN AN ORGANIZED HEALTH CARE EDUCATION/TRAINING PROGRAM

## 2023-04-26 RX ORDER — AMLODIPINE BESYLATE 5 MG/1
5 TABLET ORAL DAILY
Qty: 90 TABLET | Refills: 3 | Status: SHIPPED | OUTPATIENT
Start: 2023-04-26 | End: 2023-09-11

## 2023-04-26 ASSESSMENT — PATIENT HEALTH QUESTIONNAIRE - PHQ9
SUM OF ALL RESPONSES TO PHQ QUESTIONS 1-9: 2
10. IF YOU CHECKED OFF ANY PROBLEMS, HOW DIFFICULT HAVE THESE PROBLEMS MADE IT FOR YOU TO DO YOUR WORK, TAKE CARE OF THINGS AT HOME, OR GET ALONG WITH OTHER PEOPLE: NOT DIFFICULT AT ALL
SUM OF ALL RESPONSES TO PHQ QUESTIONS 1-9: 2

## 2023-04-26 ASSESSMENT — PAIN SCALES - GENERAL: PAINLEVEL: NO PAIN (0)

## 2023-04-26 NOTE — RESULT ENCOUNTER NOTE
Team - please call patient with results.   Kidney function is improving since stopping the ramipril. Potassium levels have normalized. No changes to our plan

## 2023-04-26 NOTE — NURSING NOTE
Patient presents to clinic for follow up with medication management, elevated labs, hypertension.    Medication Rec Complete  Malina Latham LPN............4/26/2023 10:08 AM

## 2023-04-26 NOTE — PROGRESS NOTES
"  Assessment & Plan     Primary hypertension  Potassium has normalized and kidney function improving since stopping ramipril. BP under control on norvasc. Continue current dosage  - Basic Metabolic Panel; Future  - amLODIPine (NORVASC) 5 MG tablet; Take 1 tablet (5 mg) by mouth daily  - Basic Metabolic Panel             BMI:   Estimated body mass index is 40.08 kg/m  as calculated from the following:    Height as of this encounter: 1.6 m (5' 3\").    Weight as of this encounter: 102.6 kg (226 lb 4 oz).           No follow-ups on file.    Bryant Rodriguez MD  [facility] AND [facility]   Denise is a 72 year old, presenting for the following health issues:  Follow Up (Medication management, elevated labs, hypertension)         View : No data to display.              History of Present Illness       Reason for visit:  Results from tests    She eats 2-3 servings of fruits and vegetables daily.She consumes 0 sweetened beverage(s) daily.She exercises with enough effort to increase her heart rate 10 to 19 minutes per day.  She exercises with enough effort to increase her heart rate 5 days per week. She is missing 1 dose(s) of medications per week.    Today's PHQ-9         PHQ-9 Total Score: 2    PHQ-9 Q9 Thoughts of better off dead/self-harm past 2 weeks :   Not at all    How difficult have these problems made it for you to do your work, take care of things at home, or get along with other people: Not difficult at all     Med and BP check   - last visit stopped ramipril due to worsening kidney function and start amdlodipine  - no side effects, tolerating well  - BP in the normal range now               Review of Systems   Constitutional, HEENT, cardiovascular, pulmonary, gi and gu systems are negative, except as otherwise noted.      Objective    /76 (BP Location: Right arm, Patient Position: Sitting, Cuff Size: Adult Large)   Pulse 73   Temp 97.2  F (36.2  C) (Tympanic)   Resp 20   Ht " "1.6 m (5' 3\")   Wt 102.6 kg (226 lb 4 oz)   LMP  (LMP Unknown)   SpO2 97%   BMI 40.08 kg/m    Body mass index is 40.08 kg/m .  Physical Exam   GENERAL: healthy, alert and no distress  RESP: lungs clear to auscultation - no rales, rhonchi or wheezes  CV: regular rate and rhythm, normal S1 S2, no S3 or S4, no murmur, click or rub, no peripheral edema and peripheral pulses strong    Results for orders placed or performed in visit on 04/26/23 (from the past 24 hour(s))   Basic Metabolic Panel   Result Value Ref Range    Sodium 136 136 - 145 mmol/L    Potassium 5.2 3.4 - 5.3 mmol/L    Chloride 103 98 - 107 mmol/L    Carbon Dioxide (CO2) 25 22 - 29 mmol/L    Anion Gap 8 7 - 15 mmol/L    Urea Nitrogen 26.2 (H) 8.0 - 23.0 mg/dL    Creatinine 1.35 (H) 0.51 - 0.95 mg/dL    Calcium 9.2 8.8 - 10.2 mg/dL    Glucose 108 (H) 70 - 99 mg/dL    GFR Estimate 42 (L) >60 mL/min/1.73m2                   "

## 2023-05-08 ENCOUNTER — TELEPHONE (OUTPATIENT)
Dept: FAMILY MEDICINE | Facility: OTHER | Age: 73
End: 2023-05-08
Payer: COMMERCIAL

## 2023-05-08 NOTE — TELEPHONE ENCOUNTER
Patient was scheduled on 5/19 with oncology/hematology. She saw YULIANA last week and was under the impression everything was fine so she is wondering why she is being sent there. Please call.    Kim Schulz on 5/8/2023 at 4:15 PM

## 2023-05-09 NOTE — TELEPHONE ENCOUNTER
Returned call to patient. I reiterated what was discussed with Dr. Jeancarlos Rodriguez regarding her previous history of pulmonary embolisms. Patient plans to keep her appointment to follow up on 05/19/23.    Jessica Miramontes LPN on 5/9/2023 at 3:55 PM

## 2023-05-19 ENCOUNTER — ONCOLOGY VISIT (OUTPATIENT)
Dept: ONCOLOGY | Facility: OTHER | Age: 73
End: 2023-05-19
Attending: INTERNAL MEDICINE
Payer: COMMERCIAL

## 2023-05-19 VITALS
BODY MASS INDEX: 40.03 KG/M2 | SYSTOLIC BLOOD PRESSURE: 134 MMHG | WEIGHT: 226 LBS | RESPIRATION RATE: 12 BRPM | TEMPERATURE: 97.5 F | HEART RATE: 83 BPM | DIASTOLIC BLOOD PRESSURE: 78 MMHG | OXYGEN SATURATION: 97 %

## 2023-05-19 DIAGNOSIS — Z86.718 PERSONAL HISTORY OF DVT (DEEP VEIN THROMBOSIS): ICD-10-CM

## 2023-05-19 DIAGNOSIS — Z86.711 HISTORY OF PULMONARY EMBOLISM: ICD-10-CM

## 2023-05-19 PROCEDURE — G0463 HOSPITAL OUTPT CLINIC VISIT: HCPCS

## 2023-05-19 PROCEDURE — 99205 OFFICE O/P NEW HI 60 MIN: CPT | Performed by: INTERNAL MEDICINE

## 2023-05-19 ASSESSMENT — PAIN SCALES - GENERAL: PAINLEVEL: NO PAIN (0)

## 2023-05-19 NOTE — PROGRESS NOTES
HEMATOLOGY CONSULT NOTE  May 19, 2023    Reason for consult: History of PE and DVT    Reason for referral: Dr. Jeancarlos Rodriguez    HISTORY OF PRESENT ILLNESS  Denise Barclay is a 72 year old female with PMH as stated as below who is seen     Ms Barclay was driving down from [place] to [place]. Prior to leaving she noticed swelling and redness in her left lower extremity and over the last one year she had noticed progressive shortness of breath.     She was admitted to [facility] and was diagnosed to have a PE on VQ scan 12/2022. She was started on anticoagulation. She underwent a US lower extremity at [facility] which was negative. She was treated for cellulitis of the left lower extremity. She was then transferred to [facility]. CTA was done at [facility] which showed bilateral lobar and segmental pulmonary embolus through out the lung without CT evidence of right heart strain.   US Duplex lower extremity was repeat which showed no evidence of DVT within the lower extremities. Discharged on Eliquis. She states her breathing had improvement on treatment and she was able to walk further on     Protein C is 124 and protein S is 97 on 12/2022.    She has a prior history of DVT in Left lower extremity 13 years ago. Treated with coumadin for few years. 3 pregnancies in the past. No history of pregnancy losses.  No family history of blood clots or pregnancy losses. 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 Denies any abdominal pain, weight or appetite loss.     REVIEW OF SYSTEMS  A 12-point ROS negative except as in HPI      Current Outpatient Medications   Medication Sig Dispense Refill     amLODIPine (NORVASC) 5 MG tablet Take 1 tablet (5 mg) by mouth daily 90 tablet 3     calcium citrate-vitamin D (CITRACAL) 315-250 MG-UNIT TABS per tablet Take by mouth 2 times daily       ELIQUIS ANTICOAGULANT 5 MG tablet Take 1 tablet (5 mg) by mouth 2 times daily 180 tablet  3     fish oil-omega-3 fatty acids 1000 MG capsule Take 1 capsule by mouth daily       ibuprofen (ADVIL/MOTRIN) 800 MG tablet Take 1 tablet (800 mg) by mouth every 8 hours as needed for moderate pain For pain 90 tablet 3     Menthol, Topical Analgesic, (BIOFREEZE ROLL-ON) 4 % GEL Apply topically as needed Apply to right ankle for pain       Multiple Vitamins-Iron TABS Take 1 tablet by mouth daily       omeprazole (PRILOSEC) 40 MG DR capsule Take 1 capsule (40 mg) by mouth daily as needed (gerd) For heartburn 90 capsule 3       No Known Allergies  Immunization History   Administered Date(s) Administered     COVID-19 Bivalent 12+ (Pfizer) 10/12/2022     COVID-19 MONOVALENT 12+ (Pfizer) 04/22/2021, 05/13/2021, 12/02/2021     FLUAD(HD)65+ QUAD 10/07/2021     Flu 65+ Years 10/31/2017, 10/16/2019     Influenza (High Dose) 3 valent vaccine 10/05/2018, 10/10/2019     Influenza Vaccine 65+ (Fluzone HD) 10/06/2020, 10/12/2022     Influenza Vaccine >6 months (Alfuria,Fluzone) 01/11/2016     Pneumo Conj 13-V (2010&after) 01/11/2016     Pneumococcal 23 valent 02/06/2017     TDAP (Adacel,Boostrix) 01/11/2016     Td (Adult), Adsorbed 05/28/2003     Zoster vaccine, live 01/11/2016       Past Medical History:   Diagnosis Date     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     unknow cause       Past Surgical History:   Procedure Laterality Date     AS REDUCTION OF LARGE BREAST Bilateral 10/2020    [facility]     COLONOSCOPY N/A 06/16/2022    2 sessile serrated follow up 6/16/2027       SOCIAL HISTORY  History   Smoking Status     Never   Smokeless Tobacco     Never    Social History    Substance and Sexual Activity      Alcohol use: Yes        Comment: occassional     History   Drug Use Unknown       FAMILY HISTORY  Family History   Problem Relation Age of Onset     Thyroid Disease Sister      Thyroid Disease Sister        PHYSICAL EXAMINATION  /78   Pulse 83   Temp 97.5  F (36.4  C) (Tympanic)    Resp 12   Wt 102.5 kg (226 lb)   LMP  (LMP Unknown)   SpO2 97%   BMI 40.03 kg/m    Wt Readings from Last 2 Encounters:   23 102.5 kg (226 lb)   23 102.6 kg (226 lb 4 oz)     Physical Exam  Constitutional:       Appearance: Normal appearance.   Eyes:      Conjunctiva/sclera: Conjunctivae normal.   Pulmonary:      Effort: Pulmonary effort is normal.   Musculoskeletal:      Right lower leg: No edema.      Left lower leg: No edema.   Neurological:      General: No focal deficit present.      Mental Status: She is alert and oriented to person, place, and time.       Laboratory and Imagin2022: CTA chest for PE: Bilateral lobar and segmental pulmonary embolus throughout the lungs without CT evidence of right heart strain.     ASSESSMENT AND PLAN    1. History of PE    Diagnosed with PE while she was in [place] 2022. She states she noticed left lower extremity redness prior to starting and had noticed BUHS 1 year prior. She was discharged on Eliquis and symptoms improved after that.     She also has a history of unprovoked distal left lower extremity DVT 13 years ago.     She therefore now has a history of 2 unprovoked clots. I would consider the second clot also unprovoked as it was present prior to the trip. Therefore I would recommend long term anticoagulation.     My concern for underlying inherited hypercoagulable disorder is low given her age of first presentation is 60 and has no history of blood clots in the family or during pregnancy. Discussed checking for acquired causes like antiphospholipid antibody syndrome. This could  as the recommended treatment would be warfarin. Ms. Barclay wants to hold off.     I would also recommend a CT abdomen and pelvis to rule out any malignancy. She did have a CTA which did not show any masses.     Follow up as needed.     Total time spent on the patient on day of encounter was 60 minutes doing chart review, outside record review,  review of test results, interpretation of results, patient visit and documentation.      Rain Mcbride MD

## 2023-05-19 NOTE — NURSING NOTE
Chief Complaint   Patient presents with     Hematology     Hx of DVT and PE     Medication Reconciliation: complete    Helen Kessler CMA ([facility]) 5/19/2023 2:01 PM

## 2023-05-22 DIAGNOSIS — R73.03 PRE-DIABETES: Primary | ICD-10-CM

## 2023-05-31 ENCOUNTER — HOSPITAL ENCOUNTER (OUTPATIENT)
Dept: CT IMAGING | Facility: OTHER | Age: 73
Discharge: HOME | End: 2023-05-31
Attending: INTERNAL MEDICINE
Payer: COMMERCIAL

## 2023-05-31 ENCOUNTER — LAB (OUTPATIENT)
Dept: LAB | Facility: OTHER | Age: 73
End: 2023-05-31
Attending: INTERNAL MEDICINE
Payer: COMMERCIAL

## 2023-05-31 DIAGNOSIS — R73.03 PRE-DIABETES: ICD-10-CM

## 2023-05-31 DIAGNOSIS — Z86.711 HISTORY OF PULMONARY EMBOLISM: ICD-10-CM

## 2023-05-31 DIAGNOSIS — Z86.718 PERSONAL HISTORY OF DVT (DEEP VEIN THROMBOSIS): ICD-10-CM

## 2023-05-31 LAB
CREAT SERPL-MCNC: 1.27 MG/DL (ref 0.51–0.95)
GFR SERPL CREATININE-BSD FRML MDRD: 45 ML/MIN/1.73M2

## 2023-05-31 PROCEDURE — 250N000011 HC RX IP 250 OP 636: Performed by: INTERNAL MEDICINE

## 2023-05-31 PROCEDURE — 36415 COLL VENOUS BLD VENIPUNCTURE: CPT | Mod: ZL

## 2023-05-31 PROCEDURE — 82565 ASSAY OF CREATININE: CPT | Mod: ZL

## 2023-05-31 PROCEDURE — 74177 CT ABD & PELVIS W/CONTRAST: CPT

## 2023-05-31 RX ORDER — IOPAMIDOL 755 MG/ML
131 INJECTION, SOLUTION INTRAVASCULAR ONCE
Status: COMPLETED | OUTPATIENT
Start: 2023-05-31 | End: 2023-05-31

## 2023-05-31 RX ADMIN — IOPAMIDOL 131 ML: 755 INJECTION, SOLUTION INTRAVENOUS at 12:19

## 2023-06-23 ENCOUNTER — TRANSFERRED RECORDS (OUTPATIENT)
Dept: MULTI SPECIALTY CLINIC | Facility: CLINIC | Age: 73
End: 2023-06-23

## 2023-06-28 ENCOUNTER — OFFICE VISIT (OUTPATIENT)
Dept: FAMILY MEDICINE | Facility: OTHER | Age: 73
End: 2023-06-28
Attending: STUDENT IN AN ORGANIZED HEALTH CARE EDUCATION/TRAINING PROGRAM
Payer: COMMERCIAL

## 2023-06-28 VITALS
HEART RATE: 83 BPM | WEIGHT: 231.5 LBS | TEMPERATURE: 97.9 F | RESPIRATION RATE: 20 BRPM | SYSTOLIC BLOOD PRESSURE: 134 MMHG | DIASTOLIC BLOOD PRESSURE: 64 MMHG | BODY MASS INDEX: 41.02 KG/M2 | HEIGHT: 63 IN | OXYGEN SATURATION: 93 %

## 2023-06-28 DIAGNOSIS — G62.9 NEUROPATHY: Primary | ICD-10-CM

## 2023-06-28 PROCEDURE — 99213 OFFICE O/P EST LOW 20 MIN: CPT | Performed by: STUDENT IN AN ORGANIZED HEALTH CARE EDUCATION/TRAINING PROGRAM

## 2023-06-28 PROCEDURE — G0463 HOSPITAL OUTPT CLINIC VISIT: HCPCS

## 2023-06-28 RX ORDER — GABAPENTIN 300 MG/1
300 CAPSULE ORAL 3 TIMES DAILY
Qty: 180 CAPSULE | Refills: 6 | Status: SHIPPED | OUTPATIENT
Start: 2023-06-28 | End: 2024-01-16

## 2023-06-28 ASSESSMENT — PATIENT HEALTH QUESTIONNAIRE - PHQ9
10. IF YOU CHECKED OFF ANY PROBLEMS, HOW DIFFICULT HAVE THESE PROBLEMS MADE IT FOR YOU TO DO YOUR WORK, TAKE CARE OF THINGS AT HOME, OR GET ALONG WITH OTHER PEOPLE: NOT DIFFICULT AT ALL
SUM OF ALL RESPONSES TO PHQ QUESTIONS 1-9: 2
SUM OF ALL RESPONSES TO PHQ QUESTIONS 1-9: 2

## 2023-06-28 ASSESSMENT — ENCOUNTER SYMPTOMS
BACK PAIN: 1
NUMBNESS: 1

## 2023-06-28 ASSESSMENT — PAIN SCALES - GENERAL: PAINLEVEL: MODERATE PAIN (5)

## 2023-06-28 NOTE — PROGRESS NOTES
"  Assessment & Plan     Neuropathy  Restarted gabapentin at 300 mg BID but no change after a few weeks. Will increase to 300 mg TID. Reviewed benefits vs risks and side effects of medication and patient in agreement to start taking. Offered MRI to assess for impingement but she declines at this time. Declines PT due to commute.   - gabapentin (NEURONTIN) 300 MG capsule; Take 1 capsule (300 mg) by mouth 3 times daily             BMI:   Estimated body mass index is 41.01 kg/m  as calculated from the following:    Height as of this encounter: 1.6 m (5' 3\").    Weight as of this encounter: 105 kg (231 lb 8 oz).           No follow-ups on file.    Bryant Rodriguez MD  [facility] AND [facility]    Subjective   Denise is a 73 year old, presenting for the following health issues:  Numbness (Left leg from hip to knee) and Back Pain (Lower pain, when standing)         No data to display              Numbness  Associated symptoms include numbness.   Back Pain   Associated symptoms include numbness.   History of Present Illness       Back Pain:  She presents for follow up of back pain. Patient's back pain is a recurring problem.  Location of back pain:  Right lower back and left lower back  Description of back pain: shooting  Back pain spreads: nowhere    Since patient first noticed back pain, pain is: always present, but gets better and worse  Does back pain interfere with her job:  No      Reason for visit:  Numbness and back pain  Symptom onset:  3-4 weeks ago  Symptoms include:  Pain  Symptom intensity:  Moderate  Symptom progression:  Staying the same  Had these symptoms before:  Yes  Has tried/received treatment for these symptoms:  Yes  What makes it worse:  Being on my feet too long  What makes it better:  Sit down    She eats 2-3 servings of fruits and vegetables daily.She consumes 1 sweetened beverage(s) daily.She exercises with enough effort to increase her heart rate 30 to 60 minutes per day.  She " "exercises with enough effort to increase her heart rate 5 days per week.   She is taking medications regularly.    Today's PHQ-9         PHQ-9 Total Score: 2    PHQ-9 Q9 Thoughts of better off dead/self-harm past 2 weeks :   Not at all    How difficult have these problems made it for you to do your work, take care of things at home, or get along with other people: Not difficult at all       Back pain   - comes and goes   - was worse last week, now better with taking it easy at work     Left leg neuropathy  - top of thigh goes numb when standing.   - tried gabapentin for this in the past was diagnosed as neuropathy-- stopped it bc had no pain  - now if on left leg more than two hours gets numb from knee to hip   - goes away if rests   - had gabapentin left--  Was taking it BID as before and not helping much. No side effects   - no red flag symptoms.   - no interest in injection or surgery or PT              Review of Systems   Musculoskeletal: Positive for back pain.   Neurological: Positive for numbness.      Constitutional, HEENT, cardiovascular, pulmonary, gi and gu systems are negative, except as otherwise noted.      Objective    /64 (BP Location: Right arm, Patient Position: Sitting, Cuff Size: Adult Large)   Pulse 83   Temp 97.9  F (36.6  C) (Tympanic)   Resp 20   Ht 1.6 m (5' 3\")   Wt 105 kg (231 lb 8 oz)   LMP  (LMP Unknown)   SpO2 93%   BMI 41.01 kg/m    Body mass index is 41.01 kg/m .  Physical Exam   GENERAL: healthy, alert and no distress  MS: no gross musculoskeletal defects noted, no edema  SKIN: no suspicious lesions or rashes  NEURO: Normal strength and tone, mentation intact and speech normal  Comprehensive back pain exam:  No tenderness, Range of motion not limited by pain, Lower extremity strength functional and equal on both sides and Lower extremity sensation normal and equal on both sides                    "

## 2023-06-28 NOTE — NURSING NOTE
Patient presents to clinic experiencing left leg numbness from hip to knee x 1 month.  Low back pain rated 5 when standing.    Medication Rec Complete  Malina Latham LPN............6/28/2023 3:53 PM

## 2023-07-11 ENCOUNTER — TELEPHONE (OUTPATIENT)
Dept: FAMILY MEDICINE | Facility: OTHER | Age: 73
End: 2023-07-11
Payer: COMMERCIAL

## 2023-07-11 DIAGNOSIS — M25.552 LEFT HIP PAIN: ICD-10-CM

## 2023-07-11 DIAGNOSIS — M54.16 LUMBAR BACK PAIN WITH RADICULOPATHY AFFECTING LEFT LOWER EXTREMITY: ICD-10-CM

## 2023-07-11 DIAGNOSIS — M70.62 TROCHANTERIC BURSITIS OF LEFT HIP: Primary | ICD-10-CM

## 2023-07-11 NOTE — TELEPHONE ENCOUNTER
Patient called to talk to YULIANA's nurse. YULIANA had recommended an MRI and she had refused at that time. She has decided she would like to proceed with the MRI. Please order.    Kim Schulz on 7/11/2023 at 1:48 PM

## 2023-07-11 NOTE — TELEPHONE ENCOUNTER
Patient had visit 6/28/23, where she was seen for Left leg, hip pain and numbness and declined MRI.  Patient would now like to move forward with MRI and requesting order.  MRI pended - please advise - not sure if correct MRI order.    Yamila Christensen LPN on 7/11/2023 at 4:17 PM

## 2023-07-17 ENCOUNTER — TELEPHONE (OUTPATIENT)
Dept: FAMILY MEDICINE | Facility: OTHER | Age: 73
End: 2023-07-17
Payer: COMMERCIAL

## 2023-07-17 DIAGNOSIS — M70.62 TROCHANTERIC BURSITIS OF LEFT HIP: Primary | ICD-10-CM

## 2023-07-17 NOTE — TELEPHONE ENCOUNTER
Reason for call: Request for a referral.    Referral requested for what concern?  Left hip injection    Have you already been seen by the specialty you need the referral for?  No    If no,  Where do you want to go?   [facility] in [place]    Preferred method for responding to this message: Telephone Call    Phone number patient can be reached at? Home number on file [phone] (home)    If we can't reach you directly, may we leave a detailed response at the number you provided? Yes     Kim Schulz on 7/17/2023 at 1:17 PM

## 2023-07-17 NOTE — TELEPHONE ENCOUNTER
Spoke to patient states she is wanting order for hip injection again. States has every 4 months. Would like sent to [facility] again.   Chari Hernandez LPN .............7/17/2023     3:16 PM

## 2023-07-22 ENCOUNTER — HOSPITAL ENCOUNTER (OUTPATIENT)
Dept: MRI IMAGING | Facility: OTHER | Age: 73
Discharge: HOME | End: 2023-07-22
Attending: STUDENT IN AN ORGANIZED HEALTH CARE EDUCATION/TRAINING PROGRAM | Admitting: STUDENT IN AN ORGANIZED HEALTH CARE EDUCATION/TRAINING PROGRAM
Payer: COMMERCIAL

## 2023-07-22 DIAGNOSIS — M54.16 LUMBAR BACK PAIN WITH RADICULOPATHY AFFECTING LEFT LOWER EXTREMITY: ICD-10-CM

## 2023-07-22 PROCEDURE — 72148 MRI LUMBAR SPINE W/O DYE: CPT

## 2023-07-25 ENCOUNTER — TELEPHONE (OUTPATIENT)
Dept: FAMILY MEDICINE | Facility: OTHER | Age: 73
End: 2023-07-25
Payer: COMMERCIAL

## 2023-07-25 NOTE — TELEPHONE ENCOUNTER
Needs her hip injection order sent to the following fax #[phone], [facility] in [place].  The order was sent to [facility] here, but it is much cheaper for her to go to [place].  Please re-send it .    Thank you.    Fátima Thao on 7/25/2023 at 3:26 PM

## 2023-07-26 NOTE — TELEPHONE ENCOUNTER
Patient wants to be notified when sent.  She states she has sent messages 3 times.       Lissy Maya on 7/26/2023 at 12:57 PM

## 2023-07-26 NOTE — TELEPHONE ENCOUNTER
Patient called and notified that order was refaxed to fax number she provided    Corinne R Thayer, RN on 7/26/2023 at 2:04 PM

## 2023-08-05 ENCOUNTER — HEALTH MAINTENANCE LETTER (OUTPATIENT)
Age: 73
End: 2023-08-05

## 2023-08-10 DIAGNOSIS — R52 MODERATE PAIN: ICD-10-CM

## 2023-08-10 DIAGNOSIS — Z86.718 PERSONAL HISTORY OF DVT (DEEP VEIN THROMBOSIS): ICD-10-CM

## 2023-08-10 DIAGNOSIS — Z86.711 HISTORY OF PULMONARY EMBOLISM: ICD-10-CM

## 2023-08-10 RX ORDER — IBUPROFEN 800 MG/1
800 TABLET, FILM COATED ORAL EVERY 8 HOURS PRN
Qty: 90 TABLET | Refills: 3 | OUTPATIENT
Start: 2023-08-10

## 2023-08-10 RX ORDER — APIXABAN 5 MG/1
5 TABLET, FILM COATED ORAL
Qty: 60 TABLET | Refills: 7 | Status: SHIPPED | OUTPATIENT
Start: 2023-08-10 | End: 2024-01-16

## 2023-08-10 NOTE — TELEPHONE ENCOUNTER
Patient returned call, she verified her last name and , and was informed of provider response. The patient indicates understanding of these issues and agrees with the plan. Malina Ball RN .............. 8/10/2023  3:00 PM

## 2023-08-10 NOTE — TELEPHONE ENCOUNTER
Patient returned call and states her Eliquis was written for 90 days and 3 refills. [facility] states they automatically refill prescriptions for how they are written. They will need a new prescription for 30 day supply, so that is affordable for Pt.     Last prescription:    ELIQUIS ANTICOAGULANT 5 MG tablet 180 tablet 3 4/12/2023  Yes   Sig - Route: Take 1 tablet (5 mg) by mouth 2 times daily - Oral   Sent to pharmacy as: Eliquis 5 MG Oral Tablet   Class: E-Prescribe   Order: [id number]   E-Prescribing Status: Receipt confirmed by pharmacy (4/12/2023  2:28 PM CDT)     [facility] - [address]     New prescription sent to [facility], per above written order for #60 tablets and 7 additional refills. Malina Ball RN .............. 8/10/2023  3:29 PM

## 2023-08-10 NOTE — TELEPHONE ENCOUNTER
Requested Prescriptions   Pending Prescriptions Disp Refills    ibuprofen (ADVIL/MOTRIN) 800 MG tablet 90 tablet 3     Sig: Take 1 tablet (800 mg) by mouth every 8 hours as needed for moderate pain For pain   Last Prescription Date:   10/5/22  Last Fill Qty/Refills:         90, R-3    Last Office Visit:              6/28/23   Future Office visit:             Next 5 appointments (look out 90 days)      Sep 11, 2023  2:40 PM  PHYSICAL with Bryant Rodriguez MD  [facility] ([facility] ) [address]  [place] [address]  [phone]          Malina Ball RN .............. 8/10/2023  2:44 PM

## 2023-08-10 NOTE — TELEPHONE ENCOUNTER
Reason for call: Medication or medication refill    Name of medication requested: Ibuprofen 800 mg     Are you out of the medication? no    What pharmacy [facility]    Preferred method for responding to this message: Telephone Call    Phone number patient can be reached at: Cell number on file:    Telephone Information:   Mobile [phone]       If we cannot reach you directly, may we leave a detailed response at the number you provided? Yes

## 2023-09-11 ENCOUNTER — OFFICE VISIT (OUTPATIENT)
Dept: FAMILY MEDICINE | Facility: OTHER | Age: 73
End: 2023-09-11
Attending: STUDENT IN AN ORGANIZED HEALTH CARE EDUCATION/TRAINING PROGRAM
Payer: COMMERCIAL

## 2023-09-11 VITALS
OXYGEN SATURATION: 94 % | RESPIRATION RATE: 20 BRPM | SYSTOLIC BLOOD PRESSURE: 128 MMHG | HEART RATE: 79 BPM | HEIGHT: 63 IN | WEIGHT: 228.5 LBS | TEMPERATURE: 97.5 F | DIASTOLIC BLOOD PRESSURE: 66 MMHG | BODY MASS INDEX: 40.49 KG/M2

## 2023-09-11 DIAGNOSIS — M70.62 TROCHANTERIC BURSITIS OF LEFT HIP: ICD-10-CM

## 2023-09-11 DIAGNOSIS — K21.9 GASTROESOPHAGEAL REFLUX DISEASE WITHOUT ESOPHAGITIS: ICD-10-CM

## 2023-09-11 DIAGNOSIS — Z00.00 ENCOUNTER FOR MEDICARE ANNUAL WELLNESS EXAM: Primary | ICD-10-CM

## 2023-09-11 DIAGNOSIS — I10 PRIMARY HYPERTENSION: ICD-10-CM

## 2023-09-11 PROCEDURE — G0463 HOSPITAL OUTPT CLINIC VISIT: HCPCS

## 2023-09-11 PROCEDURE — G0439 PPPS, SUBSEQ VISIT: HCPCS | Performed by: STUDENT IN AN ORGANIZED HEALTH CARE EDUCATION/TRAINING PROGRAM

## 2023-09-11 PROCEDURE — 99213 OFFICE O/P EST LOW 20 MIN: CPT | Mod: 25 | Performed by: STUDENT IN AN ORGANIZED HEALTH CARE EDUCATION/TRAINING PROGRAM

## 2023-09-11 RX ORDER — OMEPRAZOLE 40 MG/1
40 CAPSULE, DELAYED RELEASE ORAL DAILY PRN
Qty: 90 CAPSULE | Refills: 3 | Status: SHIPPED | OUTPATIENT
Start: 2023-09-11 | End: 2024-09-04

## 2023-09-11 RX ORDER — AMLODIPINE BESYLATE 5 MG/1
5 TABLET ORAL DAILY
Qty: 90 TABLET | Refills: 3 | Status: SHIPPED | OUTPATIENT
Start: 2023-09-11 | End: 2024-01-12

## 2023-09-11 ASSESSMENT — ENCOUNTER SYMPTOMS
JOINT SWELLING: 0
CONSTIPATION: 0
ARTHRALGIAS: 1
BREAST MASS: 0
FEVER: 0
HEARTBURN: 1
DYSURIA: 0
NERVOUS/ANXIOUS: 0
DIARRHEA: 1
SORE THROAT: 0
PARESTHESIAS: 1
NAUSEA: 0
WEAKNESS: 0
FREQUENCY: 0
EYE PAIN: 0
DIZZINESS: 0
MYALGIAS: 0
CHILLS: 0
HEADACHES: 0
HEMATOCHEZIA: 0
PALPITATIONS: 0
COUGH: 0
HEMATURIA: 0
ABDOMINAL PAIN: 0
SHORTNESS OF BREATH: 0

## 2023-09-11 ASSESSMENT — PAIN SCALES - GENERAL: PAINLEVEL: NO PAIN (0)

## 2023-09-11 ASSESSMENT — ACTIVITIES OF DAILY LIVING (ADL): CURRENT_FUNCTION: NO ASSISTANCE NEEDED

## 2023-09-11 NOTE — NURSING NOTE
Patient presents to clinic for annual wellness exam.  Medication Reconciliation: Complete    Malina Latham LPN  9/11/2023 2:39 PM

## 2023-09-11 NOTE — PROGRESS NOTES
SUBJECTIVE:   Denise is a 73 year old who presents for Preventive Visit.      Are you in the first 12 months of your Medicare coverage?  No    HPI    Due for medicare wellness. Wants to be sure all meds up to date/refills on file before she leaves for [place] in December       Have you ever done Advance Care Planning? (For example, a Health Directive, POLST, or a discussion with a medical provider or your loved ones about your wishes): No, advance care planning information given to patient to review.  Patient declined advance care planning discussion at this time.       Fall risk  Fallen 2 or more times in the past year?: No  Any fall with injury in the past year?: No    Cognitive Screening   1) Repeat 3 items (Leader, Season, Table)    2) Clock draw: NORMAL  3) 3 item recall: Recalls 3 objects  Results: 3 items recalled: COGNITIVE IMPAIRMENT LESS LIKELY    Mini-CogTM Copyright S [employer]. Licensed by the author for use in [facility]; reprinted with permission (soob@[facility]). All rights reserved.      Do you have sleep apnea, excessive snoring or daytime drowsiness? : no    Reviewed and updated as needed this visit by clinical staff   Tobacco  Allergies  Meds              Reviewed and updated as needed this visit by Provider     Meds             Social History     Tobacco Use     Smoking status: Former     Packs/day: 1.00     Types: Cigarettes     Start date:      Quit date:      Years since quittin.7     Passive exposure: Past     Smokeless tobacco: Never   Substance Use Topics     Alcohol use: Yes     Comment: occassional             2023     2:32 PM   Alcohol Use   Prescreen: >3 drinks/day or >7 drinks/week? No     Do you have a current opioid prescription? No  Do you use any other controlled substances or medications that are not prescribed by a provider? None              Current providers sharing in care for this patient include:   Patient Care Team:  Bryant Malcolm MD  "as PCP - General (Family Medicine)  Brendon Lyle MD as Assigned Surgical Provider  Bryant Malcolm MD as Assigned PCP  Rain Mcbride MD as Assigned Cancer Care Provider    The following health maintenance items are reviewed in Epic and correct as of today:  Health Maintenance   Topic Date Due     HEPATITIS C SCREENING  Never done     ZOSTER IMMUNIZATION (2 of 3) 03/07/2016     COVID-19 Vaccine (5 - Pfizer series) 02/12/2023     INFLUENZA VACCINE (1) 09/01/2023     MAMMO SCREENING  06/01/2024     MEDICARE ANNUAL WELLNESS VISIT  09/11/2024     FALL RISK ASSESSMENT  09/11/2024     DTAP/TDAP/TD IMMUNIZATION (2 - Td or Tdap) 01/11/2026     COLORECTAL CANCER SCREENING  06/16/2027     LIPID  12/22/2027     ADVANCE CARE PLANNING  09/11/2028     DEXA  04/28/2037     PHQ-2 (once per calendar year)  Completed     Pneumococcal Vaccine: 65+ Years  Completed     IPV IMMUNIZATION  Aged Out     HPV IMMUNIZATION  Aged Out     MENINGITIS IMMUNIZATION  Aged Out     Labs reviewed in [facility]  Mammogram Screening: Mammogram Screening - Patient over age 75, has elected to continue with screening. But wants to do them every other year        Review of Systems  Constitutional, HEENT, cardiovascular, pulmonary, gi and gu systems are negative, except as otherwise noted.    OBJECTIVE:   /66 (BP Location: Right arm, Patient Position: Sitting, Cuff Size: Adult Large)   Pulse 79   Temp 97.5  F (36.4  C) (Tympanic)   Resp 20   Ht 1.6 m (5' 3\")   Wt 103.6 kg (228 lb 8 oz)   LMP  (LMP Unknown)   SpO2 94%   BMI 40.48 kg/m   Estimated body mass index is 40.48 kg/m  as calculated from the following:    Height as of this encounter: 1.6 m (5' 3\").    Weight as of this encounter: 103.6 kg (228 lb 8 oz).  Physical Exam  GENERAL: healthy, alert and no distress  EYES: Eyes grossly normal to inspection, PERRL and conjunctivae and sclerae normal  HENT: ear canals and TM's normal, nose and mouth without ulcers or lesions  RESP: lungs clear " "to auscultation - no rales, rhonchi or wheezes  CV: regular rate and rhythm, normal S1 S2, no S3 or S4, no murmur, click or rub, no peripheral edema and peripheral pulses strong  ABDOMEN: soft, nontender, no hepatosplenomegaly, no masses and bowel sounds normal  MS: no gross musculoskeletal defects noted, no edema  SKIN: no suspicious lesions or rashes  PSYCH: mentation appears normal, affect normal/bright    Diagnostic Test Results:  Labs reviewed in Epic    ASSESSMENT / PLAN:   Denise was seen today for medicare visit.    Diagnoses and all orders for this visit:    Encounter for Medicare annual wellness exam    Trochanteric bursitis of left hip  Will need injection prior to leaving in [place], would like order now to schedule.   -     XR Joint Injection Xray Guided (Non Fluoro); Future    Gastroesophageal reflux disease without esophagitis  Stable, refilled   -     omeprazole (PRILOSEC) 40 MG DR capsule; Take 1 capsule (40 mg) by mouth daily as needed (gerd) For heartburn    Primary hypertension  Stable, refilled   -     amLODIPine (NORVASC) 5 MG tablet; Take 1 tablet (5 mg) by mouth daily    BMI 40.0-44.9, adult (H)  Discussed intermittent fasting, lower carb, moderate fat and protein, body movement etc.               COUNSELING:  Reviewed preventive health counseling, as reflected in patient instructions       Regular exercise       Healthy diet/nutrition      BMI:   Estimated body mass index is 40.48 kg/m  as calculated from the following:    Height as of this encounter: 1.6 m (5' 3\").    Weight as of this encounter: 103.6 kg (228 lb 8 oz).   Weight management plan: Discussed healthy diet and exercise guidelines      She reports that she quit smoking about 16 years ago. Her smoking use included cigarettes. She started smoking about 21 years ago. She smoked an average of 1 pack per day. She has been exposed to tobacco smoke. She has never used smokeless tobacco.      Appropriate preventive services were " discussed with this patient, including applicable screening as appropriate for cardiovascular disease, diabetes, osteopenia/osteoporosis, and glaucoma.  As appropriate for age/gender, discussed screening for colorectal cancer, prostate cancer, breast cancer, and cervical cancer. Checklist reviewing preventive services available has been given to the patient.    Reviewed patients plan of care and provided an AVS. The Basic Care Plan (routine screening as documented in Health Maintenance) for Denise meets the Care Plan requirement. This Care Plan has been established and reviewed with the Patient.          Bryant Rodriguez MD  [facility]    Identified Health Risks:  I have reviewed Opioid Use Disorder and Substance Use Disorder risk factors and made any needed referrals.

## 2023-09-11 NOTE — PATIENT INSTRUCTIONS
Try intermittent fasting-- eat in an 8 hour window (2-3 meals that are low carb, moderate fat, moderate protein). Fast for 16 hours   During fast and in between meals only water, plain coffee, plain tea.             Patient Education   Personalized Prevention Plan  You are due for the preventive services outlined below.  Your care team is available to assist you in scheduling these services.  If you have already completed any of these items, please share that information with your care team to update in your medical record.  Health Maintenance Due   Topic Date Due    Hepatitis C Screening  Never done    Zoster (Shingles) Vaccine (2 of 3) 03/07/2016    COVID-19 Vaccine (5 - Pfizer series) 02/12/2023    Flu Vaccine (1) 09/01/2023

## 2023-10-31 DIAGNOSIS — G62.9 NEUROPATHY: ICD-10-CM

## 2023-10-31 RX ORDER — GABAPENTIN 300 MG/1
300 CAPSULE ORAL 3 TIMES DAILY
Qty: 180 CAPSULE | Refills: 6 | Status: CANCELLED | OUTPATIENT
Start: 2023-10-31

## 2023-10-31 NOTE — TELEPHONE ENCOUNTER
gabapentin (NEURONTIN) 300 MG capsule 180 capsule 6 6/28/2023  --   Sig - Route: Take 1 capsule (300 mg) by mouth 3 times daily - Oral   Sent to pharmacy as: Gabapentin 300 MG Oral Capsule (NEURONTIN)   Class: E-Prescribe   Order: [id number]   E-Prescribing Status: Receipt confirmed by pharmacy (6/28/2023  4:16 PM CDT)     [facility] - [address]     If taking as directed, Pt should not run out until 6/8/24. Called and spoke to Patient after verifying last name and date of birth. Pt states ok to disregard. Malina Ball RN .............. 10/31/2023  12:14 PM

## 2023-10-31 NOTE — TELEPHONE ENCOUNTER
Reason for call: Medication or medication refill    Name of medication requested: Gabapentin    How many days of medication do you have left? 0 days    [facility]    Preferred method for responding to this message: Telephone Call    Phone number patient can be reached at: Cell number on file:    Telephone Information:   Mobile [phone]       If we cannot reach you directly, may we leave a detailed response at the number you provided? Yes    Kim Schulz on 10/31/2023 at 12:05 PM

## 2023-11-27 ENCOUNTER — TELEPHONE (OUTPATIENT)
Dept: FAMILY MEDICINE | Facility: OTHER | Age: 73
End: 2023-11-27
Payer: COMMERCIAL

## 2023-12-05 ENCOUNTER — DOCUMENTATION ONLY (OUTPATIENT)
Dept: OTHER | Facility: CLINIC | Age: 73
End: 2023-12-05
Payer: COMMERCIAL

## 2024-01-12 DIAGNOSIS — I10 PRIMARY HYPERTENSION: ICD-10-CM

## 2024-01-12 RX ORDER — AMLODIPINE BESYLATE 5 MG/1
5 TABLET ORAL DAILY
Qty: 90 TABLET | Refills: 1 | Status: SHIPPED | OUTPATIENT
Start: 2024-01-12 | End: 2024-01-16

## 2024-01-12 NOTE — TELEPHONE ENCOUNTER
[facility] sent Rx request for the following:      amLODIPine (NORVASC) 5 MG tablet 90 tablet 3 9/11/2023 -- No   Sig - Route: Take 1 tablet (5 mg) by mouth daily - Oral   Sent to pharmacy as: amLODIPine Besylate 5 MG Oral Tablet (NORVASC)   Class: E-Prescribe   Order: [id number]   E-Prescribing Status: Receipt confirmed by pharmacy (9/11/2023  2:54 PM CDT)     [facility] - [address]     New prescription sent to alternate mail order pharmacy, per above written order. Malina Ball RN .............. 1/12/2024  3:50 PM

## 2024-01-16 DIAGNOSIS — Z86.711 HISTORY OF PULMONARY EMBOLISM: ICD-10-CM

## 2024-01-16 DIAGNOSIS — I10 PRIMARY HYPERTENSION: ICD-10-CM

## 2024-01-16 DIAGNOSIS — G62.9 NEUROPATHY: ICD-10-CM

## 2024-01-16 DIAGNOSIS — Z86.718 PERSONAL HISTORY OF DVT (DEEP VEIN THROMBOSIS): ICD-10-CM

## 2024-01-16 RX ORDER — AMLODIPINE BESYLATE 5 MG/1
5 TABLET ORAL DAILY
Qty: 90 TABLET | Refills: 1 | Status: SHIPPED | OUTPATIENT
Start: 2024-01-16 | End: 2024-07-08

## 2024-01-16 NOTE — TELEPHONE ENCOUNTER
amLODIPine (NORVASC) 5 MG tablet 90 tablet 1 1/12/2024 -- No   Sig - Route: Take 1 tablet (5 mg) by mouth daily - Oral   Sent to pharmacy as: amLODIPine Besylate 5 MG Oral Tablet (NORVASC)   Class: E-Prescribe   Order: [id number]   E-Prescribing Status: Receipt confirmed by pharmacy (1/12/2024  3:50 PM CST)     [id number] - [place], [place] - [address]     New prescription sent to retail pharmacy, per above written orders.    Malina Ball RN .............. 1/16/2024  11:14 AM   8

## 2024-01-16 NOTE — TELEPHONE ENCOUNTER
Reason for call: Medication or medication refill    Name of medication requested: gabapentin, Eliquis, amlodipine-  these may have been done, but were sent to the wrong place.  [facility] and  she would like them [facility] in [place], [place], did not have a ph # for them.  She has [facility] prescription through [facility], but will straighten that out when she gets back to [place]    How many days of medication do you have left? 1 week    What pharmacy do you use? [facility], [place], [place]    Preferred method for responding to this message: Telephone Call    Phone number patient can be reached at: Home number on file [phone] (home)    If we cannot reach you directly, may we leave a detailed response at the number you provided? Yes      Lissy Maya on 1/16/2024 at 10:59 AM

## 2024-01-16 NOTE — TELEPHONE ENCOUNTER
ELIQUIS ANTICOAGULANT 5 MG tablet 60 tablet 7 8/10/2023 -- Yes   Sig - Route: Take 1 tablet (5 mg) by mouth 2 times daily - Oral   Sent to pharmacy as: Eliquis 5 MG Oral Tablet   Class: E-Prescribe   Order: [id number]   E-Prescribing Status: Receipt confirmed by pharmacy (8/10/2023  3:32 PM CDT)     [facility] - [address]      gabapentin (NEURONTIN) 300 MG capsule 180 capsule 6     Sig: Take 1 capsule (300 mg) by mouth 3 times daily   Last Prescription Date:   6/28/23  Last Fill Qty/Refills:         180, R-6      There is no refill protocol information for this order     Last Office Visit:              9/11/23   Future Office visit:           None    Per LOV note:    Return in about 53 weeks (around 9/16/2024) for Annual Wellness Visit.    Unable to complete prescription refill per RN Medication Refill Policy.     Malina Ball RN .............. 1/16/2024  11:29 AM

## 2024-01-17 RX ORDER — GABAPENTIN 300 MG/1
300 CAPSULE ORAL 3 TIMES DAILY
Qty: 180 CAPSULE | Refills: 6 | Status: SHIPPED | OUTPATIENT
Start: 2024-01-17 | End: 2024-09-04

## 2024-01-17 RX ORDER — APIXABAN 5 MG/1
5 TABLET, FILM COATED ORAL
Qty: 180 TABLET | Refills: 0 | Status: SHIPPED | OUTPATIENT
Start: 2024-01-17 | End: 2024-04-15

## 2024-01-17 NOTE — TELEPHONE ENCOUNTER
Called and spoke to Patient after verifying last name and date of birth. Pt informed of prescriptions going to [facility] in [place], [place].    Malina Ball RN .............. 1/17/2024  10:11 AM

## 2024-03-18 ENCOUNTER — TELEPHONE (OUTPATIENT)
Dept: FAMILY MEDICINE | Facility: OTHER | Age: 74
End: 2024-03-18
Payer: COMMERCIAL

## 2024-03-18 NOTE — TELEPHONE ENCOUNTER
KWA-patient wants to be seen with [facility] in [facility] it should be an external injection order  Please call with questions    Thank You    Graciela Lang on 3/18/2024 at 12:11 PM

## 2024-03-22 NOTE — TELEPHONE ENCOUNTER
Requested Unit 1 pasr refax order placed 09/11/2023 for injection at [facility] in [place].  Notified patient order was placed in Sept and also[facility] today.  She will call [facility] if not heard from them next week.    Malina Latham LPN............3/22/2024 9:11 AM

## 2024-04-15 DIAGNOSIS — Z86.711 HISTORY OF PULMONARY EMBOLISM: ICD-10-CM

## 2024-04-15 DIAGNOSIS — Z86.718 PERSONAL HISTORY OF DVT (DEEP VEIN THROMBOSIS): ICD-10-CM

## 2024-04-16 RX ORDER — APIXABAN 5 MG/1
5 TABLET, FILM COATED ORAL
Qty: 180 TABLET | Refills: 0 | Status: SHIPPED | OUTPATIENT
Start: 2024-04-16 | End: 2024-09-04

## 2024-04-16 NOTE — TELEPHONE ENCOUNTER
Requested Prescriptions   Pending Prescriptions Disp Refills    ELIQUIS ANTICOAGULANT 5 MG tablet 180 tablet 0     Sig: Take 1 tablet (5 mg) by mouth 2 times daily       Anticoagulant Agents Failed - 4/16/2024 11:35 AM        Failed - Normal Platelets on file in past 12 months     No lab results found.        Failed - GFR on file in the past 12 months and most recent GFR is normal        Failed - Recent (6 mo) or future (90 days) visit within the authorizing provider's specialty        Failed - Medication indicated for associated diagnosis     Medication is associated with one or more of the following diagnoses:  Atrial fibrillation  Deep venous thrombosis  Heparin-induced thrombocytopenia  Pulmonary embolism  Venous thromboembolism     Last Prescription Date:   1/17/24  Last Fill Qty/Refills:         180, R-0    Last Office Visit:              9/11/23   Future Office visit:           None    Per LOV note:    Return in about 53 weeks (around 9/16/2024) for Annual Wellness Visit.    Unable to complete prescription refill per RN Medication Refill Policy.     Routing to covering provider for refill consideration, as PCP/provider is out of clinic >48 hours or Pt is completely out of medication and provider is out of the clinic today.    Malina Ball RN .............. 4/16/2024  11:38 AM

## 2024-05-08 PROBLEM — M54.6 PAIN IN THORACIC SPINE: Status: ACTIVE | Noted: 2020-10-05

## 2024-05-08 PROBLEM — G89.29 CHRONIC PAIN OF BOTH SHOULDERS: Status: ACTIVE | Noted: 2020-10-05

## 2024-05-08 PROBLEM — M70.62 TROCHANTERIC BURSITIS OF LEFT HIP: Status: ACTIVE | Noted: 2021-07-11

## 2024-05-08 PROBLEM — G89.29 CHRONIC LEFT HIP PAIN: Status: ACTIVE | Noted: 2019-05-17

## 2024-05-08 PROBLEM — M18.11 ARTHRITIS OF CARPOMETACARPAL (CMC) JOINT OF RIGHT THUMB: Status: ACTIVE | Noted: 2019-05-17

## 2024-05-08 PROBLEM — Z86.16 HISTORY OF COVID-19: Status: ACTIVE | Noted: 2020-07-08

## 2024-05-08 PROBLEM — M21.929: Status: ACTIVE | Noted: 2020-10-05

## 2024-05-08 PROBLEM — M54.2 CERVICALGIA: Status: ACTIVE | Noted: 2020-10-05

## 2024-05-08 PROBLEM — M25.511 CHRONIC PAIN OF BOTH SHOULDERS: Status: ACTIVE | Noted: 2020-10-05

## 2024-05-08 PROBLEM — G57.43: Status: ACTIVE | Noted: 2017-04-03

## 2024-05-08 PROBLEM — M25.512 CHRONIC PAIN OF BOTH SHOULDERS: Status: ACTIVE | Noted: 2020-10-05

## 2024-05-08 PROBLEM — M51.379 DEGENERATIVE DISC DISEASE AT L5-S1 LEVEL: Status: ACTIVE | Noted: 2017-04-25

## 2024-05-08 PROBLEM — L30.4 INTERTRIGO: Status: ACTIVE | Noted: 2020-10-05

## 2024-05-08 PROBLEM — N62 MACROMASTIA: Status: ACTIVE | Noted: 2020-10-05

## 2024-05-08 PROBLEM — M25.552 CHRONIC LEFT HIP PAIN: Status: ACTIVE | Noted: 2019-05-17

## 2024-05-08 PROBLEM — R35.1 NOCTURIA: Status: ACTIVE | Noted: 2021-04-29

## 2024-05-09 ENCOUNTER — PATIENT OUTREACH (OUTPATIENT)
Dept: GASTROENTEROLOGY | Facility: CLINIC | Age: 74
End: 2024-05-09
Payer: COMMERCIAL

## 2024-05-09 ENCOUNTER — OFFICE VISIT (OUTPATIENT)
Dept: FAMILY MEDICINE | Facility: OTHER | Age: 74
End: 2024-05-09
Attending: PHYSICIAN ASSISTANT
Payer: COMMERCIAL

## 2024-05-09 VITALS
WEIGHT: 232.8 LBS | BODY MASS INDEX: 41.24 KG/M2 | HEART RATE: 82 BPM | DIASTOLIC BLOOD PRESSURE: 70 MMHG | TEMPERATURE: 97.9 F | OXYGEN SATURATION: 95 % | SYSTOLIC BLOOD PRESSURE: 139 MMHG

## 2024-05-09 DIAGNOSIS — Z79.899 OTHER LONG TERM (CURRENT) DRUG THERAPY: ICD-10-CM

## 2024-05-09 DIAGNOSIS — E66.01 MORBID OBESITY (H): ICD-10-CM

## 2024-05-09 DIAGNOSIS — Z13.29 SCREENING FOR THYROID DISORDER: ICD-10-CM

## 2024-05-09 DIAGNOSIS — Z86.711 HISTORY OF PULMONARY EMBOLISM: ICD-10-CM

## 2024-05-09 DIAGNOSIS — M54.2 NECK PAIN: Primary | ICD-10-CM

## 2024-05-09 LAB
ANION GAP SERPL CALCULATED.3IONS-SCNC: 10 MMOL/L (ref 7–15)
BASOPHILS # BLD AUTO: 0 10E3/UL (ref 0–0.2)
BASOPHILS NFR BLD AUTO: 0 %
BUN SERPL-MCNC: 28.6 MG/DL (ref 8–23)
CALCIUM SERPL-MCNC: 9.2 MG/DL (ref 8.8–10.2)
CHLORIDE SERPL-SCNC: 107 MMOL/L (ref 98–107)
CREAT SERPL-MCNC: 1.55 MG/DL (ref 0.51–0.95)
DEPRECATED HCO3 PLAS-SCNC: 28 MMOL/L (ref 22–29)
EGFRCR SERPLBLD CKD-EPI 2021: 35 ML/MIN/1.73M2
EOSINOPHIL # BLD AUTO: 0.2 10E3/UL (ref 0–0.7)
EOSINOPHIL NFR BLD AUTO: 2 %
ERYTHROCYTE [DISTWIDTH] IN BLOOD BY AUTOMATED COUNT: 12.2 % (ref 10–15)
GLUCOSE SERPL-MCNC: 96 MG/DL (ref 70–99)
HCT VFR BLD AUTO: 41.4 % (ref 35–47)
HGB BLD-MCNC: 13.6 G/DL (ref 11.7–15.7)
IMM GRANULOCYTES # BLD: 0.1 10E3/UL
IMM GRANULOCYTES NFR BLD: 0 %
LYMPHOCYTES # BLD AUTO: 3.8 10E3/UL (ref 0.8–5.3)
LYMPHOCYTES NFR BLD AUTO: 33 %
MCH RBC QN AUTO: 28.8 PG (ref 26.5–33)
MCHC RBC AUTO-ENTMCNC: 32.9 G/DL (ref 31.5–36.5)
MCV RBC AUTO: 88 FL (ref 78–100)
MONOCYTES # BLD AUTO: 0.8 10E3/UL (ref 0–1.3)
MONOCYTES NFR BLD AUTO: 7 %
NEUTROPHILS # BLD AUTO: 6.5 10E3/UL (ref 1.6–8.3)
NEUTROPHILS NFR BLD AUTO: 58 %
NRBC # BLD AUTO: 0 10E3/UL
NRBC BLD AUTO-RTO: 0 /100
PLATELET # BLD AUTO: 268 10E3/UL (ref 150–450)
POTASSIUM SERPL-SCNC: 4.6 MMOL/L (ref 3.4–5.3)
RBC # BLD AUTO: 4.73 10E6/UL (ref 3.8–5.2)
SODIUM SERPL-SCNC: 145 MMOL/L (ref 135–145)
TSH SERPL DL<=0.005 MIU/L-ACNC: 3.17 UIU/ML (ref 0.3–4.2)
WBC # BLD AUTO: 11.3 10E3/UL (ref 4–11)

## 2024-05-09 PROCEDURE — 99213 OFFICE O/P EST LOW 20 MIN: CPT | Performed by: PHYSICIAN ASSISTANT

## 2024-05-09 PROCEDURE — G0463 HOSPITAL OUTPT CLINIC VISIT: HCPCS

## 2024-05-09 PROCEDURE — 85048 AUTOMATED LEUKOCYTE COUNT: CPT | Mod: ZL | Performed by: PHYSICIAN ASSISTANT

## 2024-05-09 PROCEDURE — 36415 COLL VENOUS BLD VENIPUNCTURE: CPT | Mod: ZL | Performed by: PHYSICIAN ASSISTANT

## 2024-05-09 PROCEDURE — 80048 BASIC METABOLIC PNL TOTAL CA: CPT | Mod: ZL | Performed by: PHYSICIAN ASSISTANT

## 2024-05-09 PROCEDURE — 84443 ASSAY THYROID STIM HORMONE: CPT | Mod: ZL | Performed by: PHYSICIAN ASSISTANT

## 2024-05-09 NOTE — PATIENT INSTRUCTIONS
Encouraged to take tylenol for relief up to 4 times per day.  Encouraged rest and elevation.  Encouraged to use ice or heat 15 minutes at a time several times per day to decrease pain. Return to clinic with any change or worsening of symptoms.

## 2024-05-09 NOTE — PROGRESS NOTES
"  Assessment & Plan   Problem List Items Addressed This Visit          Digestive    Morbid obesity (H)    Relevant Orders    CBC and Differential    Basic Metabolic Panel    TSH Reflex [facility]     Other Visit Diagnoses       Neck pain    -  Primary    Relevant Orders    CT Soft Tissue Neck w Contrast    CBC and Differential    Basic Metabolic Panel    TSH Reflex GH    History of pulmonary embolism        Relevant Orders    CT Soft Tissue Neck w Contrast    CBC and Differential    Basic Metabolic Panel    TSH Reflex GH    Screening for thyroid disorder        Relevant Orders    CBC and Differential    Basic Metabolic Panel    TSH Reflex [facility]    Other long term (current) drug therapy        Relevant Orders    TSH Reflex [facility]           Discussed concerns at length.  With the persistent neck pain ordered lab work to rule out concerns including CBC, BMP, and TSH.  Also ordered CT of the neck to rule out concerns.  Encourage close follow-up if symptoms or not improving or worsening.  May need to order further testing if symptoms or not improving or worsening.    Morbid obesity: Encourage good diet, exercise, weight loss to help reduce future health disease complications.    Ordering of each unique test       BMI  Estimated body mass index is 41.24 kg/m  as calculated from the following:    Height as of 9/11/23: 1.6 m (5' 3\").    Weight as of this encounter: 105.6 kg (232 lb 12.8 oz).   Weight management plan: Discussed healthy diet and exercise guidelines    See Patient Instructions    No follow-ups on file.      Renetta Walker is a 73 year old, presenting for the following health issues:  Musculoskeletal Problem (Left neck pain )        5/9/2024     2:35 PM   Additional Questions   Roomed by Theodora VALDIVIA CMA     History of Present Illness       Reason for visit:  Pain on my left side of my nexk  Symptom onset:  More than a month    She eats 2-3 servings of fruits and vegetables daily.She consumes 1 sweetened beverage(s) " daily.She exercises with enough effort to increase her heart rate 30 to 60 minutes per day.  She is missing 1 dose(s) of medications per week.         Concern - neck pain  Onset: 6wks ago  Description: left side sharp pain  Intensity: moderate  Progression of Symptoms:  intermittent  Accompanying Signs & Symptoms: none  Previous history of similar problem: no  Precipitating factors:        Worsened by: no  Alleviating factors:        Improved by: biofreeze  Therapies tried and outcome: biofreeze    Patient has a burning pain on the left anterior neck.  Wondering if it is an artery or muscle concern.  Not going away.  Symptoms have been over the last month.  It is an aching pain.  Has tried using Biofreeze which has not helped.  Seems to be just behind her left sternocleidomastoid muscle.  No pain with neck movement.  It is a throbbing pain.  Wakes up with the pain.  No recent injury or trauma.  No skin changes.  History of shingles in the past however this had more skin changes and a rash.  No current skin rash.  No fevers or chills.  Otherwise feeling fine.  Can push on the area and cause discomfort.  No chest pain, palpitations, or problems breathing.  History of blood clots in the past.  Stable as compared to previous.  Currently taking her blood thinner.      Review of Systems  Constitutional, HEENT, cardiovascular, pulmonary, gi and gu systems are negative, except as otherwise noted.      Objective    /70   Pulse 82   Temp 97.9  F (36.6  C) (Tympanic)   Wt 105.6 kg (232 lb 12.8 oz)   LMP  (LMP Unknown)   SpO2 95%   BMI 41.24 kg/m    Body mass index is 41.24 kg/m .  Physical Exam  Vitals and nursing note reviewed.   Constitutional:       Appearance: Normal appearance.   HENT:      Head: Normocephalic and atraumatic.   Eyes:      Extraocular Movements: Extraocular movements intact.      Conjunctiva/sclera: Conjunctivae normal.   Cardiovascular:      Rate and Rhythm: Normal rate and regular rhythm.       Heart sounds: Normal heart sounds.   Pulmonary:      Effort: Pulmonary effort is normal.      Breath sounds: Normal breath sounds.   Musculoskeletal:         General: Normal range of motion.      Cervical back: Normal range of motion.      Comments: Left anterior neck pain to palpation behind the mid to distal left sternocleidomastoid muscle.  No lumps or overlying erythema appreciated.  No swelling or bruising.  No spinal or paraspinous muscle pain to palpation.  No cervicalgia with range of motion.   Skin:     General: Skin is warm and dry.   Neurological:      General: No focal deficit present.      Mental Status: She is alert and oriented to person, place, and time.   Psychiatric:         Mood and Affect: Mood normal.         Behavior: Behavior normal.                Signed Electronically by: Kimberly Taylor PA-C

## 2024-05-09 NOTE — NURSING NOTE
"Chief Complaint   Patient presents with    Musculoskeletal Problem     Left neck pain      Patient has left sided neck pain that started 6wks ago. Also has bulging discs in back.  Initial /70   Pulse 82   Temp 97.9  F (36.6  C) (Tympanic)   Wt 105.6 kg (232 lb 12.8 oz)   LMP  (LMP Unknown)   SpO2 95%   BMI 41.24 kg/m   Estimated body mass index is 41.24 kg/m  as calculated from the following:    Height as of 9/11/23: 1.6 m (5' 3\").    Weight as of this encounter: 105.6 kg (232 lb 12.8 oz).  Medication Review: complete    The next two questions are to help us understand your food security.  If you are feeling you need any assistance in this area, we have resources available to support you today.          5/9/2024   SDOH- Food Insecurity   Within the past 12 months, did you worry that your food would run out before you got money to buy more? N   Within the past 12 months, did the food you bought just not last and you didn t have money to get more? N         Health Care Directive:  Patient has a Health Care Directive on file      Theodora Campbell MA      "

## 2024-05-16 ENCOUNTER — TRANSFERRED RECORDS (OUTPATIENT)
Dept: HEALTH INFORMATION MANAGEMENT | Facility: OTHER | Age: 74
End: 2024-05-16
Payer: COMMERCIAL

## 2024-05-16 ENCOUNTER — MYC MEDICAL ADVICE (OUTPATIENT)
Dept: FAMILY MEDICINE | Facility: OTHER | Age: 74
End: 2024-05-16
Payer: COMMERCIAL

## 2024-05-17 ENCOUNTER — TELEPHONE (OUTPATIENT)
Dept: FAMILY MEDICINE | Facility: OTHER | Age: 74
End: 2024-05-17
Payer: COMMERCIAL

## 2024-05-17 DIAGNOSIS — M54.42 CHRONIC LEFT-SIDED LOW BACK PAIN WITH LEFT-SIDED SCIATICA: ICD-10-CM

## 2024-05-17 DIAGNOSIS — Z86.711 HISTORY OF PULMONARY EMBOLISM: ICD-10-CM

## 2024-05-17 DIAGNOSIS — Z86.718 PERSONAL HISTORY OF DVT (DEEP VEIN THROMBOSIS): ICD-10-CM

## 2024-05-17 DIAGNOSIS — G89.29 CHRONIC LEFT-SIDED LOW BACK PAIN WITH LEFT-SIDED SCIATICA: ICD-10-CM

## 2024-05-17 DIAGNOSIS — M54.16 LUMBAR BACK PAIN WITH RADICULOPATHY AFFECTING LEFT LOWER EXTREMITY: Primary | ICD-10-CM

## 2024-05-17 NOTE — TELEPHONE ENCOUNTER
Patient wants to have the injections in her back now. She wants it to go to [facility] in [facility].      Lissy Maya on 5/17/2024 at 12:45 PM

## 2024-05-17 NOTE — TELEPHONE ENCOUNTER
Patient informed of order placed and it will be faxed to [facility] in [place]. Order printed and faxed.     Anitha Paz CMA on 5/17/2024 at 2:26 PM

## 2024-05-17 NOTE — TELEPHONE ENCOUNTER
Looking for lower back injection, states that at time the pain is more on the left side but is mainly down the spine.  She states she has had hip injection prior but never in her back.  She was also asking for the cost of the injections, informed her she would have to contact [facility] for that information. She would like to have the injections done at [facility] in [facility].   Lore Sosa LPN (Ext [phone] ) ..........5/17/2024 1:29 PM

## 2024-05-29 ENCOUNTER — HOSPITAL ENCOUNTER (OUTPATIENT)
Dept: CT IMAGING | Facility: OTHER | Age: 74
Discharge: HOME | End: 2024-05-29
Attending: PHYSICIAN ASSISTANT | Admitting: PHYSICIAN ASSISTANT
Payer: COMMERCIAL

## 2024-05-29 DIAGNOSIS — Z86.711 HISTORY OF PULMONARY EMBOLISM: ICD-10-CM

## 2024-05-29 DIAGNOSIS — M54.2 NECK PAIN: ICD-10-CM

## 2024-05-29 PROCEDURE — 70491 CT SOFT TISSUE NECK W/DYE: CPT

## 2024-05-29 PROCEDURE — 250N000011 HC RX IP 250 OP 636: Performed by: PHYSICIAN ASSISTANT

## 2024-05-29 RX ORDER — IOPAMIDOL 755 MG/ML
100 INJECTION, SOLUTION INTRAVASCULAR ONCE
Status: COMPLETED | OUTPATIENT
Start: 2024-05-29 | End: 2024-05-29

## 2024-05-29 RX ADMIN — IOPAMIDOL 100 ML: 755 INJECTION, SOLUTION INTRAVENOUS at 12:27

## 2024-05-29 NOTE — PROGRESS NOTES
1.  Has the patient had a previous reaction to IV contrast? No    2.  Does the patient have kidney disease? Yes - GFR 35    3.  Is the patient on dialysis? No    If YES to any of these questions, exam will be reviewed with a Radiologist before administering contrast.  IV Contrast- Discharge Instructions After Your CT Scan      The IV contrast you received today will be filtered from your bloodstream by your kidneys during the next 24 hours and pass from the body in urine.  You will not be aware of this process and your urine will not change in color.  To help this process you should drink at least 4 additional glasses of water or juice today.  This reduces stress on your kidneys.    Most contrast reactions are immediate.  Should you develop symptoms of concern after discharge, contact the department at the number below.  After hours you should contact your personal physician.  If you develop breathing distress or wheezing, call 911.

## 2024-06-05 ENCOUNTER — TRANSFERRED RECORDS (OUTPATIENT)
Dept: HEALTH INFORMATION MANAGEMENT | Facility: OTHER | Age: 74
End: 2024-06-05
Payer: COMMERCIAL

## 2024-07-07 DIAGNOSIS — I10 PRIMARY HYPERTENSION: ICD-10-CM

## 2024-07-08 ENCOUNTER — TELEPHONE (OUTPATIENT)
Dept: FAMILY MEDICINE | Facility: OTHER | Age: 74
End: 2024-07-08
Payer: COMMERCIAL

## 2024-07-08 DIAGNOSIS — M70.62 TROCHANTERIC BURSITIS OF LEFT HIP: Primary | ICD-10-CM

## 2024-07-08 RX ORDER — AMLODIPINE BESYLATE 5 MG/1
5 TABLET ORAL DAILY
Qty: 90 TABLET | Refills: 0 | Status: SHIPPED | OUTPATIENT
Start: 2024-07-08 | End: 2024-09-04

## 2024-07-08 NOTE — TELEPHONE ENCOUNTER
Pending Prescriptions:                       Disp   Refills    amLODIPine (NORVASC) 5 MG tablet [Pharmac*90 tab*0            Sig: Take 1 tablet by mouth once daily    Last OV- 9/11/23 for Medicare exam     Last filled- 1/16/24 for 90 tablets with 1 refill     Next OV- 9/4/24 with Dr. Jeancarlos Paz CMA on 7/8/2024 at 11:41 AM

## 2024-07-08 NOTE — TELEPHONE ENCOUNTER
Patient would like to have order for left hip injection sent over to [facility] in [place]. Please call with any questions.     Clover Melo on 7/8/2024 at 11:43 AM

## 2024-07-21 ENCOUNTER — HOSPITAL ENCOUNTER (EMERGENCY)
Facility: OTHER | Age: 74
Discharge: HOME | End: 2024-07-21
Attending: EMERGENCY MEDICINE | Admitting: EMERGENCY MEDICINE
Payer: COMMERCIAL

## 2024-07-21 VITALS
RESPIRATION RATE: 20 BRPM | HEART RATE: 84 BPM | TEMPERATURE: 98.4 F | SYSTOLIC BLOOD PRESSURE: 173 MMHG | OXYGEN SATURATION: 93 % | WEIGHT: 232 LBS | HEIGHT: 63 IN | BODY MASS INDEX: 41.11 KG/M2 | DIASTOLIC BLOOD PRESSURE: 77 MMHG

## 2024-07-21 DIAGNOSIS — U07.1 COVID-19 VIRUS INFECTION: ICD-10-CM

## 2024-07-21 LAB
FLUAV RNA SPEC QL NAA+PROBE: NEGATIVE
FLUBV RNA RESP QL NAA+PROBE: NEGATIVE
GROUP A STREP BY PCR: NOT DETECTED
RSV RNA SPEC NAA+PROBE: NEGATIVE
SARS-COV-2 RNA RESP QL NAA+PROBE: POSITIVE

## 2024-07-21 PROCEDURE — 99283 EMERGENCY DEPT VISIT LOW MDM: CPT | Performed by: EMERGENCY MEDICINE

## 2024-07-21 PROCEDURE — 87637 SARSCOV2&INF A&B&RSV AMP PRB: CPT | Performed by: EMERGENCY MEDICINE

## 2024-07-21 PROCEDURE — 87651 STREP A DNA AMP PROBE: CPT | Performed by: EMERGENCY MEDICINE

## 2024-07-21 ASSESSMENT — COLUMBIA-SUICIDE SEVERITY RATING SCALE - C-SSRS
6. HAVE YOU EVER DONE ANYTHING, STARTED TO DO ANYTHING, OR PREPARED TO DO ANYTHING TO END YOUR LIFE?: NO
2. HAVE YOU ACTUALLY HAD ANY THOUGHTS OF KILLING YOURSELF IN THE PAST MONTH?: NO
1. IN THE PAST MONTH, HAVE YOU WISHED YOU WERE DEAD OR WISHED YOU COULD GO TO SLEEP AND NOT WAKE UP?: NO

## 2024-07-21 ASSESSMENT — ENCOUNTER SYMPTOMS
VOICE CHANGE: 0
SORE THROAT: 1
FEVER: 0
MYALGIAS: 1
CONSTITUTIONAL NEGATIVE: 1
TROUBLE SWALLOWING: 0
RESPIRATORY NEGATIVE: 1

## 2024-07-21 ASSESSMENT — ACTIVITIES OF DAILY LIVING (ADL)
ADLS_ACUITY_SCORE: 33
ADLS_ACUITY_SCORE: 35

## 2024-07-21 NOTE — ED TRIAGE NOTES
"ED Nursing Triage Note (General)   ________________________________    Denise Barclay is a 74 year old Female that presents to triage via private vehicle with complaints of body aches, chest tightness, pharyngitis, and cough.  Patient states cough is dry and non-productive.  Patient states she had an old z-pack from [place] last year that she used for sx.  Patient states she is concerned about covid, however, has not tested yet. No known exposure. Patient denies SOB, however, states chest is tight.  02 is 93% on RA.   Significant symptoms had onset 4 day(s) ago.  BP (!) 173/77   Pulse 84   Temp 98.4  F (36.9  C) (Tympanic)   Resp 20   Ht 1.6 m (5' 3\")   Wt 105.2 kg (232 lb)   LMP  (LMP Unknown)   SpO2 93%   BMI 41.10 kg/m    Vital signs:  Temp: 98.4  F (36.9  C) Temp src: Tympanic BP: (!) 173/77 Pulse: 84   Resp: 20 SpO2: 93 %     Height: 160 cm (5' 3\") Weight: 105.2 kg (232 lb)  Estimated body mass index is 41.1 kg/m  as calculated from the following:    Height as of this encounter: 1.6 m (5' 3\").    Weight as of this encounter: 105.2 kg (232 lb).  PRE HOSPITAL PRIOR LIVING SITUATION Spouse      Triage Assessment (Adult)       Row Name 07/21/24 1251          Triage Assessment    Airway WDL WDL        Respiratory WDL    Respiratory WDL WDL        Skin Circulation/Temperature WDL    Skin Circulation/Temperature WDL WDL        Cardiac WDL    Cardiac WDL WDL     Cardiac Rhythm NSR        Peripheral/Neurovascular WDL    Peripheral Neurovascular WDL WDL     Capillary Refill, General less than/equal to 3 secs        Cognitive/Neuro/Behavioral WDL    Cognitive/Neuro/Behavioral WDL WDL        Javad Coma Scale    Best Eye Response 4-->(E4) spontaneous     Best Motor Response 6-->(M6) obeys commands     Best Verbal Response 5-->(V5) oriented     Javad Coma Scale Score 15                     "

## 2024-07-21 NOTE — DISCHARGE INSTRUCTIONS
Your COVID test returned positive.  Rest drink plenty of fluids use Tylenol for pain or fevers.  Because of your anticoagulation and relatively mild symptoms I would not recommend that you are treated with Paxlovid.  You should be rechecked by your primary care provider sometime this week  If your symptoms worsen return to the Emergency Department

## 2024-07-21 NOTE — ED PROVIDER NOTES
History     Chief Complaint   Patient presents with    Covid Concern     HPI  Denise Barclay is a 74 year old female who presents with 3 days of concerning symptoms for possible COVID.  She complains of bodyaches sore throat malaise.  She reports she has had 2 episodes of COVID in the past.  She complains of some tightness in her chest no shortness of breath she does have a history of previous pulmonary embolism she is on long-term anticoagulation with Eliquis.  She has had no bleeding.  Mild dysphagia.  No fevers or rash.    Allergies:  No Known Allergies    Problem List:    Patient Active Problem List    Diagnosis Date Noted    Personal history of DVT (deep vein thrombosis) 05/17/2024     Priority: Medium    History of pulmonary embolism 05/17/2024     Priority: Medium    Morbid obesity (H) 04/13/2022     Priority: Medium    Trochanteric bursitis of left hip 07/11/2021     Priority: Medium    Nocturia 04/29/2021     Priority: Medium    Pain in thoracic spine 10/05/2020     Priority: Medium     Formatting of this note might be different from the original.   Added automatically from request for surgery [id number]      Macromastia 10/05/2020     Priority: Medium     Formatting of this note might be different from the original.   Added automatically from request for surgery [id number]      Intertrigo 10/05/2020     Priority: Medium     Formatting of this note might be different from the original.   Added automatically from request for surgery [id number]      Chronic pain of both shoulders 10/05/2020     Priority: Medium     Formatting of this note might be different from the original.   Added automatically from request for surgery [id number]      Cervicalgia 10/05/2020     Priority: Medium     Formatting of this note might be different from the original.   Added automatically from request for surgery [id number]      Acquired deformity of upper arm 10/05/2020     Priority: Medium     Formatting of this note might be different from  the original.   Added automatically from request for surgery [id number]  Formatting of this note might be different from the original.   Added automatically from request for surgery [id number]      History of COVID-19 2020     Priority: Medium    Chronic left hip pain 2019     Priority: Medium    Arthritis of carpometacarpal (CMC) joint of right thumb 2019     Priority: Medium    Degenerative disc disease at L5-S1 level 2017     Priority: Medium    Tibial neuropathy of both lower extremities 2017     Priority: Medium     Formatting of this note might be different from the original.   emg 2017      Plantar fasciitis 2016     Priority: Medium    IFG (impaired fasting glucose) 2014     Priority: Medium    Dyslipidemia 2014     Priority: Medium    Tubular adenoma of colon 2011     Priority: Medium     Formatting of this note might be different from the original.   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        Past Surgical History:    Past Surgical History:   Procedure Laterality Date    AS REDUCTION OF LARGE BREAST Bilateral 10/2020    [facility]    COLONOSCOPY N/A 2022    2 sessile serrated follow up 2027       Family History:    Family History   Problem Relation Age of Onset    Thyroid Disease Sister     Thyroid Disease Sister        Social History:  Marital Status:   [2]  Social History     Tobacco Use    Smoking status: Former     Current packs/day: 0.00     Average packs/day: 1 pack/day for 5.0 years (5.0 ttl pk-yrs)     Types: Cigarettes     Start date:      Quit date:      Years since quittin.5     Passive exposure: Past    Smokeless tobacco: Never   Vaping Use    Vaping status: Never Used  "  Substance Use Topics    Alcohol use: Yes     Comment: occassional    Drug use: Never        Medications:    amLODIPine (NORVASC) 5 MG tablet  calcium citrate-vitamin D (CITRACAL) 315-250 MG-UNIT TABS per tablet  ELIQUIS ANTICOAGULANT 5 MG tablet  fish oil-omega-3 fatty acids 1000 MG capsule  gabapentin (NEURONTIN) 300 MG capsule  Menthol, Topical Analgesic, (BIOFREEZE ROLL-ON) 4 % GEL  Multiple Vitamins-Iron TABS  omeprazole (PRILOSEC) 40 MG DR capsule          Review of Systems   Constitutional: Negative.  Negative for fever.   HENT:  Positive for congestion and sore throat. Negative for trouble swallowing and voice change.    Respiratory: Negative.     Cardiovascular:  Positive for chest pain.   Musculoskeletal:  Positive for myalgias.   Skin: Negative.    All other systems reviewed and are negative.      Physical Exam   BP: (!) 173/77  Pulse: 84  Temp: 98.4  F (36.9  C)  Resp: 20  Height: 160 cm (5' 3\")  Weight: 105.2 kg (232 lb)  SpO2: 93 %      Physical Exam  Vitals and nursing note reviewed.   Constitutional:       General: She is not in acute distress.     Appearance: She is well-developed. She is not ill-appearing.   HENT:      Head: Normocephalic and atraumatic.      Mouth/Throat:      Mouth: Mucous membranes are moist.      Pharynx: No oropharyngeal exudate or posterior oropharyngeal erythema.   Eyes:      General: No scleral icterus.     Conjunctiva/sclera: Conjunctivae normal.      Pupils: Pupils are equal, round, and reactive to light.   Cardiovascular:      Rate and Rhythm: Normal rate and regular rhythm.      Heart sounds: Normal heart sounds.   Pulmonary:      Effort: Pulmonary effort is normal. No respiratory distress.      Breath sounds: Normal breath sounds. No stridor. No wheezing or rales.   Musculoskeletal:      Cervical back: Neck supple.   Skin:     General: Skin is warm and dry.   Neurological:      General: No focal deficit present.      Mental Status: She is alert and oriented to person, " place, and time.      Cranial Nerves: No cranial nerve deficit.   Psychiatric:         Mood and Affect: Mood normal.         Behavior: Behavior normal.         ED Course        Procedures         Patient was swabbed for strep COVID influenza and RSV.  They all returned negative except for the COVID.  She is not hypoxic no increased work of breathing her lungs are clear on exam for this reason chest x-ray was not done.       Results for orders placed or performed during the hospital encounter of 07/21/24 (from the past 24 hour(s))   Symptomatic Influenza A/B, RSV, & SARS-CoV2 PCR (COVID-19) Nose    Specimen: Nose; Swab   Result Value Ref Range    Influenza A PCR Negative Negative    Influenza B PCR Negative Negative    RSV PCR Negative Negative    SARS CoV2 PCR Positive (A) Negative    Narrative    Testing was performed using the Xpert Xpress CoV2/Flu/RSV Assay on the Cepheid GeneXpert Instrument. This test should be ordered for the detection of SARS-CoV-2, influenza, and RSV viruses in individuals who meet clinical and/or epidemiological criteria. Test performance is unknown in asymptomatic patients. This test is for in vitro diagnostic use under the FDA EUA for laboratories certified under CLIA to perform high or moderate complexity testing. This test has not been FDA cleared or approved. A negative result does not rule out the presence of PCR inhibitors in the specimen or target RNA in concentration below the limit of detection for the assay. If only one viral target is positive but coinfection with multiple targets is suspected, the sample should be re-tested with another FDA cleared, approved, or authorized test, if coinfection would change clinical management. This test was validated by the [facility]. These laboratories are certified under the Clinical Laboratory Improvement Amendments of 1988 (CLIA-88) as qualified to perform high complexity laboratory testing.   Group A Streptococcus PCR  Throat Swab    Specimen: Throat; Swab   Result Value Ref Range    Group A strep by PCR Not Detected Not Detected    Narrative    The Xpert Xpress Strep A test, performed on the [employer], is a rapid, qualitative in vitro diagnostic test for the detection of Streptococcus pyogenes (Group A ß-hemolytic Streptococcus, Strep A) in throat swab specimens from patients with signs and symptoms of pharyngitis. The Xpert Xpress Strep A test can be used as an aid in the diagnosis of Group A Streptococcal pharyngitis. The assay is not intended to monitor treatment for Group A Streptococcus infections. The Xpert Xpress Strep A test utilizes an automated real-time polymerase chain reaction (PCR) to detect Streptococcus pyogenes DNA.       Medications - No data to display    Assessments & Plan (with Medical Decision Making)   74-year-old female on Xarelto for remote pulmonary embolism comes in with classic COVID symptoms and positive COVID test.  She has no increased work of breathing her lungs are clear she is not febrile.  We had a discussion about treatment and have decided not to given her anticoagulation.  She understands indications for return to the emergency department particularly worsening respiratory status unremitting fever or other concerns.  She cannot take NSAIDs because of her anticoagulation she should push fluids and inform people around her of her positive status.  I do think it would be worthwhile for her to be rechecked in her clinic in the next few days.  I have reviewed the nursing notes.    I have reviewed the findings, diagnosis, plan and need for follow up with the patient.          Medical Decision Making  The patient's presentation was of moderate complexity (an acute illness with systemic symptoms).    The patient's evaluation involved:  ordering and/or review of 3+ test(s) in this encounter (rapid strep, COVID, influenza, RSV.  )  COVID returned positive.  The patient's management  necessitated moderate risk (elderly woman with prior pulmonary embolism with third bout of COVID previous episodes did not require treatment or hospitalization.)    Discussed rationale for not treating with Paxlovid also discussed indications for returning to the emergency department including shortness of breath difficulty breathing unremitting fevers or other concerning symptoms.  Use Tylenol for pain push fluids and recommend primary clinic follow-up this week..        New Prescriptions    No medications on file       Final diagnoses:   COVID-19 virus infection       7/21/2024   [facility]       Richard Rey MD  07/21/24 8080

## 2024-09-04 ENCOUNTER — OFFICE VISIT (OUTPATIENT)
Dept: FAMILY MEDICINE | Facility: OTHER | Age: 74
End: 2024-09-04
Attending: STUDENT IN AN ORGANIZED HEALTH CARE EDUCATION/TRAINING PROGRAM
Payer: COMMERCIAL

## 2024-09-04 VITALS
RESPIRATION RATE: 18 BRPM | HEART RATE: 87 BPM | TEMPERATURE: 97.3 F | HEIGHT: 63 IN | SYSTOLIC BLOOD PRESSURE: 136 MMHG | OXYGEN SATURATION: 96 % | BODY MASS INDEX: 39.76 KG/M2 | DIASTOLIC BLOOD PRESSURE: 72 MMHG | WEIGHT: 224.38 LBS

## 2024-09-04 DIAGNOSIS — N18.32 STAGE 3B CHRONIC KIDNEY DISEASE (H): ICD-10-CM

## 2024-09-04 DIAGNOSIS — K21.9 GASTROESOPHAGEAL REFLUX DISEASE WITHOUT ESOPHAGITIS: ICD-10-CM

## 2024-09-04 DIAGNOSIS — Z00.00 ENCOUNTER FOR MEDICARE ANNUAL WELLNESS EXAM: Primary | ICD-10-CM

## 2024-09-04 DIAGNOSIS — Z86.718 PERSONAL HISTORY OF DVT (DEEP VEIN THROMBOSIS): ICD-10-CM

## 2024-09-04 DIAGNOSIS — I10 PRIMARY HYPERTENSION: ICD-10-CM

## 2024-09-04 DIAGNOSIS — Z86.711 HISTORY OF PULMONARY EMBOLISM: ICD-10-CM

## 2024-09-04 LAB
ANION GAP SERPL CALCULATED.3IONS-SCNC: 11 MMOL/L (ref 7–15)
BASOPHILS # BLD AUTO: 0 10E3/UL (ref 0–0.2)
BASOPHILS NFR BLD AUTO: 0 %
BUN SERPL-MCNC: 36.5 MG/DL (ref 8–23)
CALCIUM SERPL-MCNC: 9.6 MG/DL (ref 8.8–10.4)
CHLORIDE SERPL-SCNC: 102 MMOL/L (ref 98–107)
CREAT SERPL-MCNC: 1.66 MG/DL (ref 0.51–0.95)
EGFRCR SERPLBLD CKD-EPI 2021: 32 ML/MIN/1.73M2
EOSINOPHIL # BLD AUTO: 0.2 10E3/UL (ref 0–0.7)
EOSINOPHIL NFR BLD AUTO: 2 %
ERYTHROCYTE [DISTWIDTH] IN BLOOD BY AUTOMATED COUNT: 12.8 % (ref 10–15)
GLUCOSE SERPL-MCNC: 104 MG/DL (ref 70–99)
HCO3 SERPL-SCNC: 26 MMOL/L (ref 22–29)
HCT VFR BLD AUTO: 42.5 % (ref 35–47)
HGB BLD-MCNC: 13.9 G/DL (ref 11.7–15.7)
IMM GRANULOCYTES # BLD: 0.1 10E3/UL
IMM GRANULOCYTES NFR BLD: 1 %
LYMPHOCYTES # BLD AUTO: 4.2 10E3/UL (ref 0.8–5.3)
LYMPHOCYTES NFR BLD AUTO: 29 %
MCH RBC QN AUTO: 28.5 PG (ref 26.5–33)
MCHC RBC AUTO-ENTMCNC: 32.7 G/DL (ref 31.5–36.5)
MCV RBC AUTO: 87 FL (ref 78–100)
MONOCYTES # BLD AUTO: 0.8 10E3/UL (ref 0–1.3)
MONOCYTES NFR BLD AUTO: 5 %
NEUTROPHILS # BLD AUTO: 9.5 10E3/UL (ref 1.6–8.3)
NEUTROPHILS NFR BLD AUTO: 64 %
NRBC # BLD AUTO: 0 10E3/UL
NRBC BLD AUTO-RTO: 0 /100
PLATELET # BLD AUTO: 284 10E3/UL (ref 150–450)
POTASSIUM SERPL-SCNC: 4.9 MMOL/L (ref 3.4–5.3)
RBC # BLD AUTO: 4.87 10E6/UL (ref 3.8–5.2)
SODIUM SERPL-SCNC: 139 MMOL/L (ref 135–145)
WBC # BLD AUTO: 14.8 10E3/UL (ref 4–11)

## 2024-09-04 PROCEDURE — G0463 HOSPITAL OUTPT CLINIC VISIT: HCPCS

## 2024-09-04 PROCEDURE — 99213 OFFICE O/P EST LOW 20 MIN: CPT | Mod: 25 | Performed by: STUDENT IN AN ORGANIZED HEALTH CARE EDUCATION/TRAINING PROGRAM

## 2024-09-04 PROCEDURE — 80048 BASIC METABOLIC PNL TOTAL CA: CPT | Mod: ZL | Performed by: STUDENT IN AN ORGANIZED HEALTH CARE EDUCATION/TRAINING PROGRAM

## 2024-09-04 PROCEDURE — 85041 AUTOMATED RBC COUNT: CPT | Mod: ZL | Performed by: STUDENT IN AN ORGANIZED HEALTH CARE EDUCATION/TRAINING PROGRAM

## 2024-09-04 PROCEDURE — 36415 COLL VENOUS BLD VENIPUNCTURE: CPT | Mod: ZL | Performed by: STUDENT IN AN ORGANIZED HEALTH CARE EDUCATION/TRAINING PROGRAM

## 2024-09-04 PROCEDURE — G0439 PPPS, SUBSEQ VISIT: HCPCS | Performed by: STUDENT IN AN ORGANIZED HEALTH CARE EDUCATION/TRAINING PROGRAM

## 2024-09-04 RX ORDER — APIXABAN 5 MG/1
5 TABLET, FILM COATED ORAL
Qty: 180 TABLET | Refills: 4 | Status: SHIPPED | OUTPATIENT
Start: 2024-09-04

## 2024-09-04 RX ORDER — AMLODIPINE BESYLATE 5 MG/1
5 TABLET ORAL DAILY
Qty: 90 TABLET | Refills: 4 | Status: SHIPPED | OUTPATIENT
Start: 2024-09-04

## 2024-09-04 RX ORDER — OMEPRAZOLE 40 MG/1
40 CAPSULE, DELAYED RELEASE ORAL DAILY PRN
Qty: 90 CAPSULE | Refills: 3 | Status: SHIPPED | OUTPATIENT
Start: 2024-09-04

## 2024-09-04 ASSESSMENT — PAIN SCALES - GENERAL: PAINLEVEL: SEVERE PAIN (6)

## 2024-09-04 NOTE — PATIENT INSTRUCTIONS
Patient Education   Preventive Care Advice   This is general advice given by our system to help you stay healthy. However, your care team may have specific advice just for you. Please talk to your care team about your preventive care needs.  Nutrition  Eat 5 or more servings of fruits and vegetables each day.  Try wheat bread, brown rice and whole grain pasta (instead of white bread, rice, and pasta).  Get enough calcium and vitamin D. Check the label on foods and aim for 100% of the RDA (recommended daily allowance).  Lifestyle  Exercise at least 150 minutes each week  (30 minutes a day, 5 days a week).  Do muscle strengthening activities 2 days a week. These help control your weight and prevent disease.  No smoking.  Wear sunscreen to prevent skin cancer.  Have a dental exam and cleaning every 6 months.  Yearly exams  See your health care team every year to talk about:  Any changes in your health.  Any medicines your care team has prescribed.  Preventive care, family planning, and ways to prevent chronic diseases.  Shots (vaccines)   HPV shots (up to age 26), if you've never had them before.  Hepatitis B shots (up to age 59), if you've never had them before.  COVID-19 shot: Get this shot when it's due.  Flu shot: Get a flu shot every year.  Tetanus shot: Get a tetanus shot every 10 years.  Pneumococcal, hepatitis A, and RSV shots: Ask your care team if you need these based on your risk.  Shingles shot (for age 50 and up)  General health tests  Diabetes screening:  Starting at age 35, Get screened for diabetes at least every 3 years.  If you are younger than age 35, ask your care team if you should be screened for diabetes.  Cholesterol test: At age 39, start having a cholesterol test every 5 years, or more often if advised.  Bone density scan (DEXA): At age 50, ask your care team if you should have this scan for osteoporosis (brittle bones).  Hepatitis C: Get tested at least once in your life.  STIs (sexually  transmitted infections)  Before age 24: Ask your care team if you should be screened for STIs.  After age 24: Get screened for STIs if you're at risk. You are at risk for STIs (including HIV) if:  You are sexually active with more than one person.  You don't use condoms every time.  You or a partner was diagnosed with a sexually transmitted infection.  If you are at risk for HIV, ask about PrEP medicine to prevent HIV.  Get tested for HIV at least once in your life, whether you are at risk for HIV or not.  Cancer screening tests  Cervical cancer screening: If you have a cervix, begin getting regular cervical cancer screening tests starting at age 21.  Breast cancer scan (mammogram): If you've ever had breasts, begin having regular mammograms starting at age 40. This is a scan to check for breast cancer.  Colon cancer screening: It is important to start screening for colon cancer at age 45.  Have a colonoscopy test every 10 years (or more often if you're at risk) Or, ask your provider about stool tests like a FIT test every year or Cologuard test every 3 years.  To learn more about your testing options, visit:   .  For help making a decision, visit:   https://bit.ly/mm94907.  Prostate cancer screening test: If you have a prostate, ask your care team if a prostate cancer screening test (PSA) at age 55 is right for you.  Lung cancer screening: If you are a current or former smoker ages 50 to 80, ask your care team if ongoing lung cancer screenings are right for you.  For informational purposes only. Not to replace the advice of your health care provider. Copyright   2023 [employer] [facility] [employer]. All rights reserved. Clinically reviewed by the [facility] Transitions Program. [employer] [id number] - REV 01/24.     Patient Education   Preventive Care Advice   This is general advice given by our system to help you stay healthy. However, your care team may have specific advice just for you. Please talk to your care team  about your preventive care needs.  Nutrition  Eat 5 or more servings of fruits and vegetables each day.  Try wheat bread, brown rice and whole grain pasta (instead of white bread, rice, and pasta).  Get enough calcium and vitamin D. Check the label on foods and aim for 100% of the RDA (recommended daily allowance).  Lifestyle  Exercise at least 150 minutes each week  (30 minutes a day, 5 days a week).  Do muscle strengthening activities 2 days a week. These help control your weight and prevent disease.  No smoking.  Wear sunscreen to prevent skin cancer.  Have a dental exam and cleaning every 6 months.  Yearly exams  See your health care team every year to talk about:  Any changes in your health.  Any medicines your care team has prescribed.  Preventive care, family planning, and ways to prevent chronic diseases.  Shots (vaccines)   HPV shots (up to age 26), if you've never had them before.  Hepatitis B shots (up to age 59), if you've never had them before.  COVID-19 shot: Get this shot when it's due.  Flu shot: Get a flu shot every year.  Tetanus shot: Get a tetanus shot every 10 years.  Pneumococcal, hepatitis A, and RSV shots: Ask your care team if you need these based on your risk.  Shingles shot (for age 50 and up)  General health tests  Diabetes screening:  Starting at age 35, Get screened for diabetes at least every 3 years.  If you are younger than age 35, ask your care team if you should be screened for diabetes.  Cholesterol test: At age 39, start having a cholesterol test every 5 years, or more often if advised.  Bone density scan (DEXA): At age 50, ask your care team if you should have this scan for osteoporosis (brittle bones).  Hepatitis C: Get tested at least once in your life.  STIs (sexually transmitted infections)  Before age 24: Ask your care team if you should be screened for STIs.  After age 24: Get screened for STIs if you're at risk. You are at risk for STIs (including HIV) if:  You are  sexually active with more than one person.  You don't use condoms every time.  You or a partner was diagnosed with a sexually transmitted infection.  If you are at risk for HIV, ask about PrEP medicine to prevent HIV.  Get tested for HIV at least once in your life, whether you are at risk for HIV or not.  Cancer screening tests  Cervical cancer screening: If you have a cervix, begin getting regular cervical cancer screening tests starting at age 21.  Breast cancer scan (mammogram): If you've ever had breasts, begin having regular mammograms starting at age 40. This is a scan to check for breast cancer.  Colon cancer screening: It is important to start screening for colon cancer at age 45.  Have a colonoscopy test every 10 years (or more often if you're at risk) Or, ask your provider about stool tests like a FIT test every year or Cologuard test every 3 years.  To learn more about your testing options, visit:   .  For help making a decision, visit:   https://bit.ly/vo39773.  Prostate cancer screening test: If you have a prostate, ask your care team if a prostate cancer screening test (PSA) at age 55 is right for you.  Lung cancer screening: If you are a current or former smoker ages 50 to 80, ask your care team if ongoing lung cancer screenings are right for you.  For informational purposes only. Not to replace the advice of your health care provider. Copyright   2023 [employer]. All rights reserved. Clinically reviewed by the [facility] Transitions Program. [employer] [id number] - REV 01/24.

## 2024-09-04 NOTE — PROGRESS NOTES
Preventive Care Visit  [facility] AND \[facility]\""  Bryant Rodriguez MD, Family Medicine  Sep 4, 2024      Assessment & Plan     Encounter for Medicare annual wellness exam  Declines mammogram   - Basic Metabolic Panel; Future  - CBC and Differential; Future  - Basic Metabolic Panel  - CBC and Differential    Primary hypertension  Chronic stable refilled   - amLODIPine (NORVASC) 5 MG tablet; Take 1 tablet (5 mg) by mouth daily.    Personal history of DVT (deep vein thrombosis)  Chronic stable refilled   - ELIQUIS ANTICOAGULANT 5 MG tablet; Take 1 tablet (5 mg) by mouth 2 times daily.    History of pulmonary embolism  Chronic stable refilled   - ELIQUIS ANTICOAGULANT 5 MG tablet; Take 1 tablet (5 mg) by mouth 2 times daily.    Gastroesophageal reflux disease without esophagitis  Chronic stable refilled   - omeprazole (PRILOSEC) 40 MG DR capsule; Take 1 capsule (40 mg) by mouth daily as needed (gerd). For heartburn    CKD stage 3b  Referral to nephrology             Counseling  Appropriate preventive services were addressed with this patient via screening, questionnaire, or discussion as appropriate for fall prevention, nutrition, physical activity, Tobacco-use cessation, social engagement, weight loss and cognition.  Checklist reviewing preventive services available has been given to the patient.  Reviewed patient's diet, addressing concerns and/or questions.   The patient was instructed to see the dentist every 6 months.   She is at risk for psychosocial distress and has been provided with information to reduce risk.           No follow-ups on file.    Renetta Walker is a 74 year old, presenting for the following:  Medicare Visit (Annual wellness exam)          Via the Health Maintenance questionnaire, the patient has reported the following services have been completed -Mammogram: [facility] 2022-06-30, this information has been sent to the abstraction team.      HPI  Due for physical   Med refills                9/4/2024   General Health   How would you rate your overall physical health? Good   Feel stress (tense, anxious, or unable to sleep) Only a little      (!) STRESS CONCERN      9/4/2024   Nutrition   Diet: Regular (no restrictions)            9/4/2024   Exercise   Days per week of moderate/strenous exercise 6 days            9/4/2024   Social Factors   Frequency of gathering with friends or relatives Patient declined   Worry food won't last until get money to buy more No   Food not last or not have enough money for food? No   Do you have housing? (Housing is defined as stable permanent housing and does not include staying ouside in a car, in a tent, in an abandoned building, in an overnight shelter, or couch-surfing.) Yes   Are you worried about losing your housing? No   Lack of transportation? No   Unable to get utilities (heat,electricity)? No            9/4/2024   Fall Risk   Fallen 2 or more times in the past year? No   Trouble with walking or balance? No             9/4/2024   Activities of Daily Living- Home Safety   Needs help with the following daily activites None of the above   Safety concerns in the home None of the above            9/4/2024   Dental   Dentist two times every year? (!) NO            9/4/2024   Hearing Screening   Hearing concerns? None of the above            9/4/2024   Driving Risk Screening   Patient/family members have concerns about driving No            9/4/2024   General Alertness/Fatigue Screening   Have you been more tired than usual lately? No            9/4/2024   Urinary Incontinence Screening   Bothered by leaking urine in past 6 months No            9/4/2024   TB Screening   Were you born outside of the [place]? No              Today's PHQ-2 Score:       5/9/2024     2:08 PM   PHQ-2 ( 1999 [employer])   Q1: Little interest or pleasure in doing things 0   Q2: Feeling down, depressed or hopeless 0   PHQ-2 Score 0   Q1: Little interest or pleasure in doing things Not at all    Q2: Feeling down, depressed or hopeless Not at all   PHQ-2 Score 0         2024   Substance Use   Alcohol more than 3/day or more than 7/wk No   Do you have a current opioid prescription? No   How severe/bad is pain from 1 to 10? 6/10   Do you use any other substances recreationally? No        Social History     Tobacco Use    Smoking status: Former     Current packs/day: 0.00     Average packs/day: 1 pack/day for 5.0 years (5.0 ttl pk-yrs)     Types: Cigarettes     Start date:      Quit date:      Years since quittin.6     Passive exposure: Past    Smokeless tobacco: Never   Vaping Use    Vaping status: Never Used   Substance Use Topics    Alcohol use: Yes     Comment: occassional    Drug use: Never           2022   LAST FHS-7 RESULTS   1st degree relative breast or ovarian cancer No   Any relative bilateral breast cancer No   Any male have breast cancer No   Any ONE woman have BOTH breast AND ovarian cancer No   Any woman with breast cancer before 50yrs No   2 or more relatives with breast AND/OR ovarian cancer No   2 or more relatives with breast AND/OR bowel cancer No           Mammogram Screening - Mammography discussed and declined    ASCVD Risk   The 10-year ASCVD risk score (Alan ESCOBAR, et al., 2019) is: 21.6%    Values used to calculate the score:      Age: 74 years      Sex: Female      Is Non- : No      Diabetic: No      Tobacco smoker: No      Systolic Blood Pressure: 136 mmHg      Is BP treated: Yes      HDL Cholesterol: 33 mg/dL      Total Cholesterol: 208 mg/dL            Reviewed and updated as needed this visit by Provider                    Labs reviewed in EPIC  Current providers sharing in care for this patient include:  Patient Care Team:  Bryant Malcolm MD as PCP - General (Family Medicine)  Rain Mcbride MD as Assigned Cancer Care Provider  Bryant Malcolm MD as Assigned PCP    The following health maintenance items are  "reviewed in Epic and correct as of today:  Health Maintenance   Topic Date Due    HEPATITIS C SCREENING  Never done    RSV VACCINE (1 - 1-dose 60+ series) Never done    ZOSTER IMMUNIZATION (2 of 3) 03/07/2016    LIPID  12/22/2023    MAMMO SCREENING  06/01/2024    INFLUENZA VACCINE (1) 09/01/2024    COVID-19 Vaccine (5 - 2023-24 season) 09/01/2024    MEDICARE ANNUAL WELLNESS VISIT  09/11/2024    FALL RISK ASSESSMENT  09/04/2025    DTAP/TDAP/TD IMMUNIZATION (2 - Td or Tdap) 01/11/2026    GLUCOSE  05/09/2027    COLORECTAL CANCER SCREENING  06/16/2027    ADVANCE CARE PLANNING  12/05/2028    DEXA  04/28/2037    PHQ-2 (once per calendar year)  Completed    Pneumococcal Vaccine: 65+ Years  Completed    HPV IMMUNIZATION  Aged Out    MENINGITIS IMMUNIZATION  Aged Out    RSV MONOCLONAL ANTIBODY  Aged Out         Review of Systems  Constitutional, HEENT, cardiovascular, pulmonary, gi and gu systems are negative, except as otherwise noted.     Objective    Exam  /72 (BP Location: Right arm, Patient Position: Sitting, Cuff Size: Adult Large)   Pulse 87   Temp 97.3  F (36.3  C) (Tympanic)   Resp 18   Ht 1.594 m (5' 2.75\")   Wt 101.8 kg (224 lb 6 oz)   LMP  (LMP Unknown)   SpO2 96%   BMI 40.06 kg/m     Estimated body mass index is 40.06 kg/m  as calculated from the following:    Height as of this encounter: 1.594 m (5' 2.75\").    Weight as of this encounter: 101.8 kg (224 lb 6 oz).    Physical Exam  GENERAL: alert and no distress  RESP: lungs clear to auscultation - no rales, rhonchi or wheezes  CV: regular rate and rhythm, normal S1 S2, no S3 or S4, no murmur, click or rub, no peripheral edema   PSYCH: mentation appears normal, affect normal/bright        9/4/2024   Mini Cog   Clock Draw Score 2 Normal   3 Item Recall 3 objects recalled   Mini Cog Total Score 5                 Signed Electronically by: Bryant Rodriguez MD    "

## 2024-09-04 NOTE — NURSING NOTE
"Chief Complaint   Patient presents with    Medicare Visit     Annual wellness exam       Initial /72 (BP Location: Right arm, Patient Position: Sitting, Cuff Size: Adult Large)   Pulse 87   Temp 97.3  F (36.3  C) (Tympanic)   Resp 18   Ht 1.594 m (5' 2.75\")   Wt 101.8 kg (224 lb 6 oz)   LMP  (LMP Unknown)   SpO2 96%   BMI 40.06 kg/m   Estimated body mass index is 40.06 kg/m  as calculated from the following:    Height as of this encounter: 1.594 m (5' 2.75\").    Weight as of this encounter: 101.8 kg (224 lb 6 oz).  Medication Review: complete    The next two questions are to help us understand your food security.  If you are feeling you need any assistance in this area, we have resources available to support you today.          9/4/2024   SDOH- Food Insecurity   Within the past 12 months, did you worry that your food would run out before you got money to buy more? N   Within the past 12 months, did the food you bought just not last and you didn t have money to get more? N            Health Care Directive:  Patient has a Health Care Directive on file      Malina Latham LPN      "

## 2024-09-10 ENCOUNTER — TELEPHONE (OUTPATIENT)
Dept: FAMILY MEDICINE | Facility: OTHER | Age: 74
End: 2024-09-10
Payer: COMMERCIAL

## 2024-09-10 NOTE — TELEPHONE ENCOUNTER
Referral placed on 9/4 for Chronic Kidney disease.   Patient states that she thought her numbers went down recently, but her mom had passed away due to Kidney disease.  She would like a little more information on why she was being referred.  She did mention that she thinks she should be seen by them but wants to know why.   Lore Sosa LPN (Ext [phone] ) ..........9/10/2024 11:23 AM

## 2024-09-10 NOTE — TELEPHONE ENCOUNTER
Patient is wanting someone to call her to discuss her referral that was placed on 9/4 for nephrology. She would like to know why it was needed  Kimberly Aquino on 9/10/2024 at 10:41 AM

## 2024-09-11 NOTE — TELEPHONE ENCOUNTER
Patient notified of provider's response and recommendations.  She verbally understood and will schedule appointment with the Nephrologist.    Malina Latham LPN............9/11/2024 2:37 PM

## 2024-09-11 NOTE — TELEPHONE ENCOUNTER
Kidney function is continuing to worse. I want her to see nephrology to review meds and history to see if intervention or further work up is needed

## 2024-12-01 ENCOUNTER — HEALTH MAINTENANCE LETTER (OUTPATIENT)
Age: 74
End: 2024-12-01

## 2024-12-04 ENCOUNTER — OFFICE VISIT (OUTPATIENT)
Dept: FAMILY MEDICINE | Facility: OTHER | Age: 74
End: 2024-12-04
Attending: STUDENT IN AN ORGANIZED HEALTH CARE EDUCATION/TRAINING PROGRAM
Payer: COMMERCIAL

## 2024-12-04 VITALS
HEIGHT: 63 IN | HEART RATE: 80 BPM | RESPIRATION RATE: 20 BRPM | TEMPERATURE: 97.6 F | DIASTOLIC BLOOD PRESSURE: 86 MMHG | WEIGHT: 230.25 LBS | SYSTOLIC BLOOD PRESSURE: 160 MMHG | OXYGEN SATURATION: 95 % | BODY MASS INDEX: 40.8 KG/M2

## 2024-12-04 DIAGNOSIS — E66.813 CLASS 3 SEVERE OBESITY DUE TO EXCESS CALORIES WITH SERIOUS COMORBIDITY AND BODY MASS INDEX (BMI) OF 40.0 TO 44.9 IN ADULT (H): ICD-10-CM

## 2024-12-04 DIAGNOSIS — I10 PRIMARY HYPERTENSION: Primary | ICD-10-CM

## 2024-12-04 DIAGNOSIS — R73.03 PRE-DIABETES: ICD-10-CM

## 2024-12-04 DIAGNOSIS — E66.01 CLASS 3 SEVERE OBESITY DUE TO EXCESS CALORIES WITH SERIOUS COMORBIDITY AND BODY MASS INDEX (BMI) OF 40.0 TO 44.9 IN ADULT (H): ICD-10-CM

## 2024-12-04 LAB
EST. AVERAGE GLUCOSE BLD GHB EST-MCNC: 131 MG/DL
HBA1C MFR BLD: 6.2 %

## 2024-12-04 PROCEDURE — 83036 HEMOGLOBIN GLYCOSYLATED A1C: CPT | Mod: ZL | Performed by: STUDENT IN AN ORGANIZED HEALTH CARE EDUCATION/TRAINING PROGRAM

## 2024-12-04 PROCEDURE — G0463 HOSPITAL OUTPT CLINIC VISIT: HCPCS

## 2024-12-04 PROCEDURE — 36415 COLL VENOUS BLD VENIPUNCTURE: CPT | Mod: ZL | Performed by: STUDENT IN AN ORGANIZED HEALTH CARE EDUCATION/TRAINING PROGRAM

## 2024-12-04 RX ORDER — AMLODIPINE BESYLATE 10 MG/1
10 TABLET ORAL DAILY
Qty: 90 TABLET | Refills: 4 | Status: SHIPPED | OUTPATIENT
Start: 2024-12-04

## 2024-12-04 RX ORDER — LOSARTAN POTASSIUM 25 MG/1
25 TABLET ORAL DAILY
Qty: 90 TABLET | Refills: 4 | Status: SHIPPED | OUTPATIENT
Start: 2024-12-04

## 2024-12-04 ASSESSMENT — ANXIETY QUESTIONNAIRES
7. FEELING AFRAID AS IF SOMETHING AWFUL MIGHT HAPPEN: NOT AT ALL
GAD7 TOTAL SCORE: 3
6. BECOMING EASILY ANNOYED OR IRRITABLE: SEVERAL DAYS
2. NOT BEING ABLE TO STOP OR CONTROL WORRYING: SEVERAL DAYS
IF YOU CHECKED OFF ANY PROBLEMS ON THIS QUESTIONNAIRE, HOW DIFFICULT HAVE THESE PROBLEMS MADE IT FOR YOU TO DO YOUR WORK, TAKE CARE OF THINGS AT HOME, OR GET ALONG WITH OTHER PEOPLE: NOT DIFFICULT AT ALL
3. WORRYING TOO MUCH ABOUT DIFFERENT THINGS: SEVERAL DAYS
7. FEELING AFRAID AS IF SOMETHING AWFUL MIGHT HAPPEN: NOT AT ALL
GAD7 TOTAL SCORE: 3
5. BEING SO RESTLESS THAT IT IS HARD TO SIT STILL: NOT AT ALL
GAD7 TOTAL SCORE: 3
1. FEELING NERVOUS, ANXIOUS, OR ON EDGE: NOT AT ALL
4. TROUBLE RELAXING: NOT AT ALL
8. IF YOU CHECKED OFF ANY PROBLEMS, HOW DIFFICULT HAVE THESE MADE IT FOR YOU TO DO YOUR WORK, TAKE CARE OF THINGS AT HOME, OR GET ALONG WITH OTHER PEOPLE?: NOT DIFFICULT AT ALL

## 2024-12-04 ASSESSMENT — PAIN SCALES - GENERAL: PAINLEVEL_OUTOF10: MILD PAIN (3)

## 2024-12-04 ASSESSMENT — PATIENT HEALTH QUESTIONNAIRE - PHQ9
10. IF YOU CHECKED OFF ANY PROBLEMS, HOW DIFFICULT HAVE THESE PROBLEMS MADE IT FOR YOU TO DO YOUR WORK, TAKE CARE OF THINGS AT HOME, OR GET ALONG WITH OTHER PEOPLE: NOT DIFFICULT AT ALL
SUM OF ALL RESPONSES TO PHQ QUESTIONS 1-9: 1
SUM OF ALL RESPONSES TO PHQ QUESTIONS 1-9: 1

## 2024-12-04 NOTE — NURSING NOTE
"Chief Complaint   Patient presents with    Follow Up     Episodes of elevated blood pressure       Initial BP (!) 160/86 (BP Location: Right arm, Patient Position: Sitting, Cuff Size: Adult Large)   Pulse 80   Temp 97.6  F (36.4  C) (Tympanic)   Resp 20   Ht 1.594 m (5' 2.75\")   Wt 104.4 kg (230 lb 4 oz)   LMP  (LMP Unknown)   SpO2 95%   BMI 41.11 kg/m   Estimated body mass index is 41.11 kg/m  as calculated from the following:    Height as of this encounter: 1.594 m (5' 2.75\").    Weight as of this encounter: 104.4 kg (230 lb 4 oz).  Medication Review: complete    The next two questions are to help us understand your food security.  If you are feeling you need any assistance in this area, we have resources available to support you today.          9/4/2024   SDOH- Food Insecurity   Within the past 12 months, did you worry that your food would run out before you got money to buy more? N    Within the past 12 months, did the food you bought just not last and you didn t have money to get more? N        Patient-reported         Health Care Directive:  Patient has a Health Care Directive on file      Malina Latham LPN      "

## 2024-12-04 NOTE — PROGRESS NOTES
"  Assessment & Plan     Primary hypertension  Not well controlled. Has increased norvasc to 10 mg at home so will send in Rx for 10 mg, will also add losartan 25 mg daily. She leaves for [place] for the winter so she will update me with BP via [employer].   - losartan (COZAAR) 25 MG tablet; Take 1 tablet (25 mg) by mouth daily.  - amLODIPine (NORVASC) 10 MG tablet; Take 1 tablet (10 mg) by mouth daily.    Pre-diabetes  Obesity, class 3, BMI 41  A1c still in pre-diabetic range. We reviewed GLP1 agonist for weight loss, she will consider this   - Hemoglobin A1c; Future  - Hemoglobin A1c      The longitudinal plan of care for the diagnosis(es)/condition(s) as documented were addressed during this visit. Due to the added complexity in care, I will continue to support Denise in the subsequent management and with ongoing continuity of care.      BMI  Estimated body mass index is 41.11 kg/m  as calculated from the following:    Height as of this encounter: 1.594 m (5' 2.75\").    Weight as of this encounter: 104.4 kg (230 lb 4 oz).             No follow-ups on file.    Subjective   Denise is a 74 year old, presenting for the following health issues:  Follow Up (Episodes of elevated blood pressure)      Via the Health Maintenance questionnaire, the patient has reported the following services have been completed -[facility]ammogram: [facility] 2022-06-[facility], this information has been sent to the abstraction team.  History of Present Illness       Reason for visit:  Blood pressure and med update   She is taking medications regularly.     Here to review blood pressure  Recently saw nephrology  Recommend checking home BP and if elevated increase norcasc, if still elevated add losartan  Home BP have been >150/80 at times  Wants to lose weight                   Review of Systems  Constitutional, HEENT, cardiovascular, pulmonary, gi and gu systems are negative, except as otherwise noted.      Objective    BP (!) 160/86 (BP Location: Right " "arm, Patient Position: Sitting, Cuff Size: Adult Large)   Pulse 80   Temp 97.6  F (36.4  C) (Tympanic)   Resp 20   Ht 1.594 m (5' 2.75\")   Wt 104.4 kg (230 lb 4 oz)   LMP  (LMP Unknown)   SpO2 95%   BMI 41.11 kg/m    Body mass index is 41.11 kg/m .  Physical Exam   GENERAL: alert and no distress  RESP: lungs clear to auscultation - no rales, rhonchi or wheezes  CV: regular rate and rhythm, normal S1 S2, no S3 or S4, no murmur, click or rub, no peripheral edema   MS: no gross musculoskeletal defects noted, no edema  PSYCH: mentation appears normal, affect normal/bright    Results for orders placed or performed in visit on 12/04/24 (from the past 24 hours)   Hemoglobin A1c   Result Value Ref Range    Estimated Average Glucose 131 (H) <117 mg/dL    Hemoglobin A1C 6.2 (H) <5.7 %           Signed Electronically by: Bryant Rodriguez MD    "

## 2025-01-06 ENCOUNTER — TELEPHONE (OUTPATIENT)
Dept: FAMILY MEDICINE | Facility: OTHER | Age: 75
End: 2025-01-06
Payer: COMMERCIAL

## 2025-01-06 NOTE — TELEPHONE ENCOUNTER
Reason for call: Medication or medication refill    Have you contacted your pharmacy regarding this refill? YES    If No, direct the patient to contact the Pharmacy and discontinue telephone note using Erroneous Encounter.  If Yes, continue.    Name of medication requested: eloquist amlodipine, losartin    How many days of medication do you have left? 7    What pharmacy do you use? [facility] in [facility]     Preferred method for responding to this message: Telephone Call    Phone number patient can be reached at: Cell number on file:    Telephone Information:   Mobile [phone]       If we cannot reach you directly, may we leave a detailed response at the number you provided? Yes    Nehal Vogt on 1/6/2025 at 10:29 AM

## 2025-01-06 NOTE — TELEPHONE ENCOUNTER
Spoke with patient. Instructed her to call her local [facility] pharmacy in [place] and have then transfer these scripts from [place] to the one local to her. They are in [place] for the next three months. She will do that and get back to us if she has any difficulty.   Mabel Michelle RN on 1/6/2025 at 11:26 AM

## 2025-03-10 ENCOUNTER — TELEPHONE (OUTPATIENT)
Dept: FAMILY MEDICINE | Facility: OTHER | Age: 75
End: 2025-03-10
Payer: COMMERCIAL

## 2025-03-10 DIAGNOSIS — M25.552 LEFT HIP PAIN: Primary | ICD-10-CM

## 2025-03-10 DIAGNOSIS — M70.62 TROCHANTERIC BURSITIS OF LEFT HIP: ICD-10-CM

## 2025-03-10 NOTE — TELEPHONE ENCOUNTER
Looking to get the order sent to [facility] in [facility] for the left hip injection so she can get the appointment set. Please let her know when the order gets sent. Last one was sent in December.   Zuleyma Hoskins on 3/10/2025 at 11:34 AM

## 2025-04-09 ENCOUNTER — HOSPITAL ENCOUNTER (OUTPATIENT)
Dept: GENERAL RADIOLOGY | Facility: OTHER | Age: 75
Discharge: HOME | End: 2025-04-09
Attending: STUDENT IN AN ORGANIZED HEALTH CARE EDUCATION/TRAINING PROGRAM
Payer: COMMERCIAL

## 2025-04-09 ENCOUNTER — OFFICE VISIT (OUTPATIENT)
Dept: FAMILY MEDICINE | Facility: OTHER | Age: 75
End: 2025-04-09
Attending: STUDENT IN AN ORGANIZED HEALTH CARE EDUCATION/TRAINING PROGRAM
Payer: COMMERCIAL

## 2025-04-09 VITALS
RESPIRATION RATE: 20 BRPM | TEMPERATURE: 97.3 F | DIASTOLIC BLOOD PRESSURE: 75 MMHG | WEIGHT: 234.4 LBS | BODY MASS INDEX: 41.85 KG/M2 | HEART RATE: 83 BPM | OXYGEN SATURATION: 96 % | SYSTOLIC BLOOD PRESSURE: 130 MMHG

## 2025-04-09 DIAGNOSIS — M25.561 ACUTE PAIN OF RIGHT KNEE: ICD-10-CM

## 2025-04-09 DIAGNOSIS — F40.240 CLAUSTROPHOBIA: ICD-10-CM

## 2025-04-09 DIAGNOSIS — M54.50 CHRONIC MIDLINE LOW BACK PAIN WITHOUT SCIATICA: ICD-10-CM

## 2025-04-09 DIAGNOSIS — G89.29 CHRONIC RIGHT SHOULDER PAIN: Primary | ICD-10-CM

## 2025-04-09 DIAGNOSIS — M25.511 CHRONIC RIGHT SHOULDER PAIN: Primary | ICD-10-CM

## 2025-04-09 DIAGNOSIS — M25.511 CHRONIC RIGHT SHOULDER PAIN: ICD-10-CM

## 2025-04-09 DIAGNOSIS — G89.29 CHRONIC MIDLINE LOW BACK PAIN WITHOUT SCIATICA: ICD-10-CM

## 2025-04-09 DIAGNOSIS — G89.29 CHRONIC RIGHT SHOULDER PAIN: ICD-10-CM

## 2025-04-09 PROCEDURE — G0463 HOSPITAL OUTPT CLINIC VISIT: HCPCS

## 2025-04-09 PROCEDURE — 73562 X-RAY EXAM OF KNEE 3: CPT | Mod: RT

## 2025-04-09 PROCEDURE — 73030 X-RAY EXAM OF SHOULDER: CPT | Mod: RT

## 2025-04-09 RX ORDER — DIAZEPAM 5 MG/1
TABLET ORAL
Qty: 4 TABLET | Refills: 0 | Status: SHIPPED | OUTPATIENT
Start: 2025-04-09

## 2025-04-09 ASSESSMENT — PAIN SCALES - GENERAL: PAINLEVEL_OUTOF10: MILD PAIN (3)

## 2025-04-09 NOTE — PROGRESS NOTES
"  Assessment & Plan     Chronic right shoulder pain  Chronic but progressive. Rotator cuff tear vs tendinopathy. Already doing home ROM exercises. Discussed supportive cares and will get MRI to further assess  - MR Shoulder Right w/o Contrast; Future    Chronic midline low back pain without sciatica  Chronic, progressive. She would like referral to Dr. Garcia  - Spine  Referral; Future  - MR Lumbar Spine w/o Contrast; Future    Acute pain of right knee  Likely strain. Could be meniscal tear. Discussed supportive cares  - XR Knee Right 3 Views; Future    Claustrophobia  To take prior to MRI   - diazepam (VALIUM) 5 MG tablet; Take 1 tablet 15-20 minutes prior to MRI, may repeat once      The longitudinal plan of care for the diagnosis(es)/condition(s) as documented were addressed during this visit. Due to the added complexity in care, I will continue to support Denise in the subsequent management and with ongoing continuity of care.      BMI  Estimated body mass index is 41.85 kg/m  as calculated from the following:    Height as of 12/4/24: 1.594 m (5' 2.75\").    Weight as of this encounter: 106.3 kg (234 lb 6.4 oz).             No follow-ups on file.    Renetta Walker is a 74 year old, presenting for the following health issues:  Shoulder Pain (Right Side)      4/9/2025    11:00 AM   Additional Questions   Roomed by Ilana   Accompanied by Self     Via the Health Maintenance questionnaire, the patient has reported the following services have been completed -Mammogram: [facility] 2023-06-23, this information has been sent to the abstraction team.  Shoulder Pain    History of Present Illness       Reason for visit:  Shoulder and knee pain She is missing 1 dose(s) of medications per week.        Right shoulder pain  Chronic now progressively worsening.   Now unable to lift arm up without pain   No known trauma  Was trying exercises at home     Right knee- a few weeks ago slipped on snow and hyperextended " her knee. On going pain     Spine-- chronic low back pain. Is interested in discussing treatment options with a surgeon.                Review of Systems  Constitutional, HEENT, cardiovascular, pulmonary, gi and gu systems are negative, except as otherwise noted.      Objective    /75 (BP Location: Right arm, Patient Position: Sitting, Cuff Size: Adult Large)   Pulse 83   Temp 97.3  F (36.3  C) (Tympanic)   Resp 20   Wt 106.3 kg (234 lb 6.4 oz)   LMP  (LMP Unknown)   SpO2 96%   BMI 41.85 kg/m    Body mass index is 41.85 kg/m .  Physical Exam   GENERAL: alert and no distress  MS: right shoulder tender to palpation over humeral head, pain with forward flexion and abduction. Normal younger and empty can test. Right knee tender to palpation over lateral joint line, normal ROM   SKIN: no suspicious lesions or rashes  PSYCH: mentation appears normal, affect normal/bright    XR Knee Right 3 Views    Result Date: 4/9/2025  PROCEDURE:  XR KNEE RIGHT 3 VIEWS HISTORY: slipped on snow a few weeks ago with hyperextension and now on going lateral joint line pain; Acute pain of right knee COMPARISON:  None. TECHNIQUE:  3 views of the right knee were obtained. FINDINGS:  No fracture or dislocation is identified. The joint spaces are preserved.      IMPRESSION: Normal right knee  NEVILLE CHOU MD   SYSTEM ID:  [id number]    XR Shoulder Right G/E 3 Views    Result Date: 4/9/2025  PROCEDURE:  XR SHOULDER RIGHT G/E 3 VIEWS HISTORY: chronic progressive right shoulder pain; Chronic right shoulder pain; Chronic right shoulder pain COMPARISON:  None. TECHNIQUE:  3 views of the right shoulder were obtained. FINDINGS:  No fracture or dislocation is identified. The joint spaces are preserved.      IMPRESSION: Normal right shoulder  NEVILLE CHOU MD   SYSTEM ID:  [id number]         Signed Electronically by: Bryant Rodriguez MD

## 2025-04-24 ENCOUNTER — HOSPITAL ENCOUNTER (OUTPATIENT)
Dept: MRI IMAGING | Facility: OTHER | Age: 75
Discharge: HOME | End: 2025-04-24
Attending: STUDENT IN AN ORGANIZED HEALTH CARE EDUCATION/TRAINING PROGRAM
Payer: COMMERCIAL

## 2025-04-24 DIAGNOSIS — G89.29 CHRONIC MIDLINE LOW BACK PAIN WITHOUT SCIATICA: ICD-10-CM

## 2025-04-24 DIAGNOSIS — G89.29 CHRONIC RIGHT SHOULDER PAIN: ICD-10-CM

## 2025-04-24 DIAGNOSIS — M25.511 CHRONIC RIGHT SHOULDER PAIN: ICD-10-CM

## 2025-04-24 DIAGNOSIS — M54.50 CHRONIC MIDLINE LOW BACK PAIN WITHOUT SCIATICA: ICD-10-CM

## 2025-04-24 PROCEDURE — 72148 MRI LUMBAR SPINE W/O DYE: CPT

## 2025-04-24 PROCEDURE — 73221 MRI JOINT UPR EXTREM W/O DYE: CPT | Mod: RT

## 2025-05-05 ENCOUNTER — TELEPHONE (OUTPATIENT)
Dept: FAMILY MEDICINE | Facility: OTHER | Age: 75
End: 2025-05-05
Payer: COMMERCIAL

## 2025-05-05 DIAGNOSIS — M25.511 CHRONIC RIGHT SHOULDER PAIN: Primary | ICD-10-CM

## 2025-05-05 DIAGNOSIS — G89.29 CHRONIC RIGHT SHOULDER PAIN: Primary | ICD-10-CM

## 2025-05-05 NOTE — TELEPHONE ENCOUNTER
Patient has MRI imaging completed on 04/24/25 to Lumbar Spine and Right Shoulder.  She is inquiring about results from those exams.  Please advise.    Malina Latham LPN............5/5/2025 1:25 PM

## 2025-05-05 NOTE — TELEPHONE ENCOUNTER
Lumbar spine (low back) has a bigger disc bulge that is pushing on a nerve at level L5.   No rotator cuff (muscle) tears but there are areas impinging/pushing on each other causing pain. I recommend PT for that.   If further questions then recommend an office visit to review results

## 2025-05-05 NOTE — TELEPHONE ENCOUNTER
Patient states she had some tests done about a week ago. Got results but doesn't know what they mean. Asking if the  Or one of her nurses can contact her to go over what the results mean      Bill Turner on 5/5/2025 at 12:22 PM

## 2025-05-07 NOTE — TELEPHONE ENCOUNTER
Left message for patient to return call to clinic.  Malina Latham LPN............5/7/2025 10:39 AM

## 2025-05-07 NOTE — TELEPHONE ENCOUNTER
She went to PT last year for this and she is doing exercises at home. She did an injection in her hip but it didn't help because he couldn't get it in the right spot.     She has not done PT for her shoulder. Is there anything else she can do for the pain?     Norma J. Gosselin, LPN .......  5/7/2025  9:37 AM

## 2025-05-08 NOTE — TELEPHONE ENCOUNTER
Notified patient of response per provider.  Assisted in scheduling office visit tomorrow for discussion on pain medication management.    Malina Latham LPN............5/8/2025 5:16 PM

## 2025-05-08 NOTE — TELEPHONE ENCOUNTER
If she is wanting to discuss a pain medication such as an opioid that would require an office visit to review as it is a controlled substance.   Can use any add on/same day/double book   Ortho referral placed

## 2025-05-08 NOTE — TELEPHONE ENCOUNTER
Patient would like a referral to Orthopedics.  Also, she is requesting medication for pain rated 8 to lower back.  Patient states when she tries to 'do anything' the pain worsens.  She utilizes [facility] pharmacy.    Malina Latham LPN............5/8/2025 11:28 AM

## 2025-05-09 PROBLEM — Z86.718 HISTORY OF BLOOD CLOTS: Status: ACTIVE | Noted: 2025-05-09

## 2025-05-26 ENCOUNTER — PATIENT OUTREACH (OUTPATIENT)
Dept: CARE COORDINATION | Facility: CLINIC | Age: 75
End: 2025-05-26
Payer: COMMERCIAL

## 2025-06-10 ENCOUNTER — TRANSFERRED RECORDS (OUTPATIENT)
Dept: HEALTH INFORMATION MANAGEMENT | Facility: OTHER | Age: 75
End: 2025-06-10
Payer: COMMERCIAL

## 2025-09-03 ENCOUNTER — TELEPHONE (OUTPATIENT)
Dept: FAMILY MEDICINE | Facility: OTHER | Age: 75
End: 2025-09-03
Payer: COMMERCIAL

## (undated) DEVICE — SOL WATER 1500ML

## (undated) DEVICE — SUCTION MANIFOLD NEPTUNE 2 SYS 4 PORT 0702-020-000

## (undated) DEVICE — ENDO KIT COMPLIANCE DYKENDOCMPLY

## (undated) DEVICE — TUBING SUCTION 10'X3/16" N510

## (undated) DEVICE — ENDO SNARE EXACTO COLD 9MM LOOP 2.4MMX230CM 00711115

## (undated) DEVICE — ENDO TRAP POLYP E-TRAP 00711099

## (undated) DEVICE — ENDO BRUSH CHANNEL MASTER CLEANING 2-4.2MM BW-412T

## (undated) RX ORDER — TRIAMCINOLONE ACETONIDE 40 MG/ML
INJECTION, SUSPENSION INTRA-ARTICULAR; INTRAMUSCULAR
Status: DISPENSED
Start: 2022-09-07

## (undated) RX ORDER — PROPOFOL 10 MG/ML
INJECTION, EMULSION INTRAVENOUS
Status: DISPENSED
Start: 2022-06-16

## (undated) RX ORDER — BUPIVACAINE HYDROCHLORIDE 5 MG/ML
INJECTION, SOLUTION EPIDURAL; INTRACAUDAL
Status: DISPENSED
Start: 2022-09-07

## (undated) RX ORDER — LIDOCAINE HYDROCHLORIDE 10 MG/ML
INJECTION, SOLUTION INFILTRATION; PERINEURAL
Status: DISPENSED
Start: 2022-09-07

## (undated) RX ORDER — CEFUROXIME AXETIL 250 MG/1
TABLET ORAL
Status: DISPENSED
Start: 2022-06-02

## (undated) RX ORDER — LIDOCAINE HYDROCHLORIDE 20 MG/ML
INJECTION, SOLUTION EPIDURAL; INFILTRATION; INTRACAUDAL; PERINEURAL
Status: DISPENSED
Start: 2022-06-16